# Patient Record
Sex: MALE
[De-identification: names, ages, dates, MRNs, and addresses within clinical notes are randomized per-mention and may not be internally consistent; named-entity substitution may affect disease eponyms.]

---
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
## 2024-01-18 ENCOUNTER — OUTPATIENT (OUTPATIENT)
Dept: OUTPATIENT SERVICES | Age: 19
LOS: 1 days | End: 2024-01-18

## 2024-01-18 ENCOUNTER — TRANSCRIPTION ENCOUNTER (OUTPATIENT)
Age: 19
End: 2024-01-18

## 2024-01-18 VITALS
TEMPERATURE: 98 F | RESPIRATION RATE: 12 BRPM | HEART RATE: 83 BPM | OXYGEN SATURATION: 100 % | SYSTOLIC BLOOD PRESSURE: 116 MMHG | DIASTOLIC BLOOD PRESSURE: 75 MMHG | WEIGHT: 155.65 LBS | HEIGHT: 70.28 IN

## 2024-01-18 DIAGNOSIS — Z98.890 OTHER SPECIFIED POSTPROCEDURAL STATES: Chronic | ICD-10-CM

## 2024-01-18 DIAGNOSIS — K50.90 CROHN'S DISEASE, UNSPECIFIED, WITHOUT COMPLICATIONS: ICD-10-CM

## 2024-01-18 DIAGNOSIS — R06.83 SNORING: ICD-10-CM

## 2024-01-18 LAB
ANION GAP SERPL CALC-SCNC: 11 MMOL/L — SIGNIFICANT CHANGE UP (ref 7–14)
BLD GP AB SCN SERPL QL: NEGATIVE — SIGNIFICANT CHANGE UP
BUN SERPL-MCNC: 10 MG/DL — SIGNIFICANT CHANGE UP (ref 7–23)
CALCIUM SERPL-MCNC: 9.3 MG/DL — SIGNIFICANT CHANGE UP (ref 8.4–10.5)
CHLORIDE SERPL-SCNC: 102 MMOL/L — SIGNIFICANT CHANGE UP (ref 98–107)
CO2 SERPL-SCNC: 27 MMOL/L — SIGNIFICANT CHANGE UP (ref 22–31)
CREAT SERPL-MCNC: 0.91 MG/DL — SIGNIFICANT CHANGE UP (ref 0.5–1.3)
EGFR: 125 ML/MIN/1.73M2 — SIGNIFICANT CHANGE UP
GLUCOSE SERPL-MCNC: 84 MG/DL — SIGNIFICANT CHANGE UP (ref 70–99)
HCT VFR BLD CALC: 41.3 % — SIGNIFICANT CHANGE UP (ref 39–50)
HGB BLD-MCNC: 13.8 G/DL — SIGNIFICANT CHANGE UP (ref 13–17)
MCHC RBC-ENTMCNC: 27.8 PG — SIGNIFICANT CHANGE UP (ref 27–34)
MCHC RBC-ENTMCNC: 33.4 GM/DL — SIGNIFICANT CHANGE UP (ref 32–36)
MCV RBC AUTO: 83.1 FL — SIGNIFICANT CHANGE UP (ref 80–100)
NRBC # BLD: 0 /100 WBCS — SIGNIFICANT CHANGE UP (ref 0–0)
NRBC # FLD: 0 K/UL — SIGNIFICANT CHANGE UP (ref 0–0)
PLATELET # BLD AUTO: 368 K/UL — SIGNIFICANT CHANGE UP (ref 150–400)
POTASSIUM SERPL-MCNC: 4.3 MMOL/L — SIGNIFICANT CHANGE UP (ref 3.5–5.3)
POTASSIUM SERPL-SCNC: 4.3 MMOL/L — SIGNIFICANT CHANGE UP (ref 3.5–5.3)
RBC # BLD: 4.97 M/UL — SIGNIFICANT CHANGE UP (ref 4.2–5.8)
RBC # FLD: 12.5 % — SIGNIFICANT CHANGE UP (ref 10.3–14.5)
RH IG SCN BLD-IMP: POSITIVE — SIGNIFICANT CHANGE UP
SODIUM SERPL-SCNC: 140 MMOL/L — SIGNIFICANT CHANGE UP (ref 135–145)
WBC # BLD: 8.68 K/UL — SIGNIFICANT CHANGE UP (ref 3.8–10.5)
WBC # FLD AUTO: 8.68 K/UL — SIGNIFICANT CHANGE UP (ref 3.8–10.5)

## 2024-01-18 RX ORDER — SODIUM CHLORIDE 9 MG/ML
3 INJECTION INTRAMUSCULAR; INTRAVENOUS; SUBCUTANEOUS ONCE
Refills: 0 | Status: DISCONTINUED | OUTPATIENT
Start: 2024-01-19 | End: 2024-01-23

## 2024-01-18 RX ORDER — LIDOCAINE 4 G/100G
1 CREAM TOPICAL ONCE
Refills: 0 | Status: DISCONTINUED | OUTPATIENT
Start: 2024-01-19 | End: 2024-01-23

## 2024-01-18 NOTE — H&P PST ADULT - REASON FOR ADMISSION
PST evaluation in preparation for a laparoscopic assisted ileocolic resection on 1/19/24 with Dr. Liz at [facility] [place].

## 2024-01-18 NOTE — H&P PST ADULT - ASSESSMENT
17 y/o male who presents to PST without any evidence of  acute illness or infection.  Informed parent to notify Dr. Liz if pt. develops any illness prior to dos.   CHG wipes provided at [facility].

## 2024-01-18 NOTE — H&P PST ADULT - ANESTHESIA, PREVIOUS REACTION, PROFILE
none H/o snoring with colonoscopy and tonsillar hypertrophy, last  colonoscopy in 2023  mom denies any issues.

## 2024-01-18 NOTE — H&P PST ADULT - ATTENDING COMMENTS
GIA GONZALEZ is a 18y male with ileal Crohn disease here for laparoscopic-assisted ileocectomy  I have discussed all of the risks benefits and alternatives of the procedure including but not limited to bleeding, infection, damage to nearby structures including ureters, anastomotic leak, need for stoma, anastomotic stricture, recurrent CD, small bowel obstruction, need for repeat/future procedures.  Consent is signed and on chart.

## 2024-01-18 NOTE — H&P PST ADULT - COMMENTS
FMH:  15 y/o sister: H/o seizure disorder, Autism, h/o intestinal surgery from a foreign body  Mother: H/o 2 C-sections, HTN-monitoring  Father: No PMH, No PSH  MGM: Asthma, H/o spinal fusion  MGF: Unknown   PGM: HTN, No PSH  PGF: DM, HTN, H/o cardiac bypass surgery

## 2024-01-18 NOTE — H&P PST ADULT - GASTROINTESTINAL COMMENTS
Dx with Crohn's disease in 2018 after having abdominal pain and having increased bowel movement.  Started Remicade after dx. H/o exacerbation in October 2023, worsening abdominal pain.  Started to take Rinvoke, with no improvement. Dx with Crohn's disease in 2018 after having abdominal pain and having increased bowel movement.  Started Remicade after dx. H/o exacerbation in October 2023, worsening abdominal pain.  Started to take Rinvoq, with no improvement.  Mother reports recent colonoscopy performed at [facility] on 1/10/24 showed 8-10 inches of inflammation at ileum.  Pt. was evaluated by Dr. Liz on 12/29/23 via telehealth and is now scheduled for surgical intervention.

## 2024-01-18 NOTE — H&P PST ADULT - ENMT COMMENTS
H/o loud snoring, denies any pauses H/o loud snoring, denies any pauses.  Pt. was evaluated by Dr. Guevara on 12/20/22 for evaluation of snoring and was noted to have tonsillar hypertrophy (4+).  Home sleep study was ordered and pt. reports was attempted x3, but they were unable to get any results based on the study.

## 2024-01-18 NOTE — H&P PST PEDIATRIC - COMMENTS
17 yo male with PMH significant for Crohn's disease dx in March 2018 who is currently on Remicade.  S/p CT abdomen and pelvis on 12/18/23 showed marked distal ileal circumferential wall thickening with findings c/w active Crohn's disease with no evidence of perforation or abscess formation.  Pt. is now scheduled for a laparoscopic assisted ileocolic resection on 1/19/24 with Dr. Liz at [facility] [place].    John  17 yo male with PMH significant for Crohn's disease dx in March 2018 who is currently on Remicade.  S/p CT abdomen and pelvis on 12/18/23 showed marked distal ileal circumferential wall thickening with findings c/w active Crohn's disease with no evidence of perforation or abscess formation.  Pt. was taking Remicade, but was discontinued approximately 3 months ago. Pt. is now scheduled for a laparoscopic assisted ileocolic resection on 1/19/24 with Dr. Liz at [facility] [place].    John

## 2024-01-18 NOTE — H&P PST PEDIATRIC - SYMPTOMS
Pt. dx with ileal Crohn's disease in March 2018 and has been doing well on Remicade.  S/p endoscopy and colonoscopy in June 2022 did show some active inflammation more prominent and there was visual active ileal disease with an increase in colonic inflammation though was not particular prominent.  Pt. s/p CT abdomen and pelvis on 12/18/23 which showed marked distal and terminal ileal circumferential wall thickening c/w active Crohn's disease and no evidence of perforation or abscess formation.  10 mm lower left pole indeterminate hypodensity. 1.1 cm right posterior interpolar hypodensity possible fluids collection representing a simple cyst requiring no follow-up imaging and normal appendix noted.

## 2024-01-18 NOTE — H&P PST ADULT - TYMPANIC MEMBRANE L
What Is The Reason For Today's Visit?: Full Body Skin Examination
What Is The Reason For Today's Visit? (Being Monitored For X): concerning skin lesions on an annual basis
How Severe Are Your Spot(S)?: moderate
normal light reflex

## 2024-01-18 NOTE — H&P PST ADULT - GASTROINTESTINAL
details… Pt. with mild periumbilical abdominal pain without any guarding or rebound tenderness./normal/soft/nondistended/normal active bowel sounds

## 2024-01-18 NOTE — H&P PST ADULT - HISTORY OF PRESENT ILLNESS
19 yo male with PMH significant for Crohn's disease dx in March 2018 who is currently on Remicade.  S/p CT abdomen and pelvis on 12/18/23 showed marked distal ileal circumferential wall thickening with findings c/w active Crohn's disease with no evidence of perforation or abscess formation.  Pt. was taking Remicade, but was discontinued approximately 3 months ago. Pt. is now scheduled for a laparoscopic assisted ileocolic resection on 1/19/24 with Dr. Liz at [facility] [place].    John    19 yo male with PMH significant for Crohn's disease dx in March 2018 who is currently on Remicade.  S/p CT abdomen and pelvis on 12/18/23 showed marked distal ileal circumferential wall thickening with findings c/w active Crohn's disease with no evidence of perforation or abscess formation.  Pt. was taking Remicade, but was discontinued approximately 3 months ago. Pt. is now scheduled for a laparoscopic assisted ileocolic resection on 1/19/24 with Dr. Liz at [facility] [place].    Colonoscopy performed last Wednesday.    Denies    17 yo male with PMH significant for Crohn's disease dx in March 2018 who is currently on Remicade.  S/p CT abdomen and pelvis on 12/18/23 showed marked distal ileal circumferential wall thickening with findings c/w active Crohn's disease with no evidence of perforation or abscess formation.  Pt. was taking Remicade which was discontinued approximately 3 months ago. Mother reports recent colonoscopy performed on 1/10/24 at [facility] last week showed 8-10 inches of inflammation at the ileum.  on Pt. is now scheduled for a laparoscopic assisted ileocolic resection on 1/19/24 with Dr. Liz at [facility] [place].    Mother reports pt. had a colonoscopy several years ago where snoring was noted by anesthesiologist, but mother denies any recent concerns with anesthesia last week.  Denies any bleeding complications with prior surgical challenges.

## 2024-01-18 NOTE — H&P PST PEDIATRIC - REASON FOR ADMISSION
PST evaluation in preparation for a laparoscopic assisted ileocolic resection on 1/19/24 with Dr. Liz at [facility] [place].

## 2024-01-18 NOTE — H&P PST ADULT - PROBLEM SELECTOR PLAN 1
Scheduled for a laparoscopic assisted ileocolic resection on 1/19/24 with Dr. Liz at [facility] [place].

## 2024-01-19 ENCOUNTER — INPATIENT (INPATIENT)
Age: 19
LOS: 4 days | Discharge: HOME | End: 2024-01-24
Attending: HOSPITALIST | Admitting: HOSPITALIST
Payer: COMMERCIAL

## 2024-01-19 ENCOUNTER — TRANSCRIPTION ENCOUNTER (OUTPATIENT)
Age: 19
End: 2024-01-19

## 2024-01-19 ENCOUNTER — RESULT REVIEW (OUTPATIENT)
Age: 19
End: 2024-01-19

## 2024-01-19 VITALS
DIASTOLIC BLOOD PRESSURE: 66 MMHG | OXYGEN SATURATION: 97 % | RESPIRATION RATE: 16 BRPM | SYSTOLIC BLOOD PRESSURE: 124 MMHG | TEMPERATURE: 98 F | HEART RATE: 100 BPM | HEIGHT: 70.28 IN | WEIGHT: 154.98 LBS

## 2024-01-19 DIAGNOSIS — Z98.890 OTHER SPECIFIED POSTPROCEDURAL STATES: Chronic | ICD-10-CM

## 2024-01-19 DIAGNOSIS — K50.90 CROHN'S DISEASE, UNSPECIFIED, WITHOUT COMPLICATIONS: ICD-10-CM

## 2024-01-19 PROCEDURE — 44205 LAP COLECTOMY PART W/ILEUM: CPT

## 2024-01-19 PROCEDURE — 88312 SPECIAL STAINS GROUP 1: CPT | Mod: 26

## 2024-01-19 PROCEDURE — 88307 TISSUE EXAM BY PATHOLOGIST: CPT | Mod: 26

## 2024-01-19 DEVICE — STAPLER COVIDIEN GIA 80-3.0MM PURPLE RELOAD
Type: IMPLANTABLE DEVICE | Status: NON-FUNCTIONAL
Removed: 2024-01-19

## 2024-01-19 DEVICE — STAPLER COVIDIEN GIA 80-3.0MM PURPLE
Type: IMPLANTABLE DEVICE | Status: NON-FUNCTIONAL
Removed: 2024-01-19

## 2024-01-19 DEVICE — STAPLER COVIDIEN TRI-STAPLE 60MM PURPLE RELOAD
Type: IMPLANTABLE DEVICE | Status: NON-FUNCTIONAL
Removed: 2024-01-19

## 2024-01-19 DEVICE — STAPLER COVIDIEN TRI-STAPLE 30MM PURPLE RELOAD
Type: IMPLANTABLE DEVICE | Status: NON-FUNCTIONAL
Removed: 2024-01-19

## 2024-01-19 RX ORDER — PIPERACILLIN AND TAZOBACTAM 4; .5 G/20ML; G/20ML
3375 INJECTION, POWDER, LYOPHILIZED, FOR SOLUTION INTRAVENOUS EVERY 6 HOURS
Refills: 0 | Status: COMPLETED | OUTPATIENT
Start: 2024-01-19 | End: 2024-01-20

## 2024-01-19 RX ORDER — DEXTROSE MONOHYDRATE, SODIUM CHLORIDE, AND POTASSIUM CHLORIDE 50; .745; 4.5 G/1000ML; G/1000ML; G/1000ML
1000 INJECTION, SOLUTION INTRAVENOUS
Refills: 0 | Status: DISCONTINUED | OUTPATIENT
Start: 2024-01-19 | End: 2024-01-20

## 2024-01-19 RX ORDER — FENTANYL CITRATE 50 UG/ML
35 INJECTION INTRAVENOUS
Refills: 0 | Status: DISCONTINUED | OUTPATIENT
Start: 2024-01-19 | End: 2024-01-19

## 2024-01-19 RX ORDER — OXYCODONE HYDROCHLORIDE 5 MG/1
7 TABLET ORAL ONCE
Refills: 0 | Status: DISCONTINUED | OUTPATIENT
Start: 2024-01-19 | End: 2024-01-19

## 2024-01-19 RX ORDER — ONDANSETRON 8 MG/1
4 TABLET, FILM COATED ORAL ONCE
Refills: 0 | Status: DISCONTINUED | OUTPATIENT
Start: 2024-01-19 | End: 2024-01-19

## 2024-01-19 RX ORDER — ACETAMINOPHEN 500 MG
1000 TABLET ORAL EVERY 6 HOURS
Refills: 0 | Status: COMPLETED | OUTPATIENT
Start: 2024-01-19 | End: 2024-01-20

## 2024-01-19 RX ORDER — ENOXAPARIN SODIUM 100 MG/ML
40 INJECTION SUBCUTANEOUS DAILY
Refills: 0 | Status: DISCONTINUED | OUTPATIENT
Start: 2024-01-19 | End: 2024-01-23

## 2024-01-19 RX ORDER — KETOROLAC TROMETHAMINE 30 MG/ML
15 SYRINGE (ML) INJECTION EVERY 6 HOURS
Refills: 0 | Status: DISCONTINUED | OUTPATIENT
Start: 2024-01-19 | End: 2024-01-20

## 2024-01-19 RX ORDER — HYDROMORPHONE HYDROCHLORIDE 2 MG/ML
0.5 INJECTION INTRAMUSCULAR; INTRAVENOUS; SUBCUTANEOUS
Refills: 0 | Status: DISCONTINUED | OUTPATIENT
Start: 2024-01-19 | End: 2024-01-19

## 2024-01-19 RX ADMIN — DEXTROSE MONOHYDRATE, SODIUM CHLORIDE, AND POTASSIUM CHLORIDE 110 MILLILITER(S): 50; .745; 4.5 INJECTION, SOLUTION INTRAVENOUS at 16:57

## 2024-01-19 RX ADMIN — Medication 15 MILLIGRAM(S): at 22:27

## 2024-01-19 RX ADMIN — PIPERACILLIN AND TAZOBACTAM 112.5 MILLIGRAM(S): 4; .5 INJECTION, POWDER, LYOPHILIZED, FOR SOLUTION INTRAVENOUS at 23:19

## 2024-01-19 RX ADMIN — Medication 15 MILLIGRAM(S): at 23:00

## 2024-01-19 RX ADMIN — Medication 400 MILLIGRAM(S): at 20:20

## 2024-01-19 NOTE — CHART NOTE - NSCHARTNOTEFT_GEN_A_CORE
POST-OP NOTE    GIA GONZALEZ | [id number] | [facility] [place] [facility]    Procedure: s/p     Subjective:    Vital Signs Last 24 Hrs  T(C): 36.3 (19 Jan 2024 15:50), Max: 36.6 (19 Jan 2024 12:25)  T(F): 97.3 (19 Jan 2024 15:50), Max: 97.9 (19 Jan 2024 12:25)  HR: 84 (19 Jan 2024 17:00) (62 - 100)  BP: 115/64 (19 Jan 2024 17:00) (93/50 - 124/66)  BP(mean): 79 (19 Jan 2024 17:00) (63 - 79)  RR: 13 (19 Jan 2024 17:00) (11 - 20)  SpO2: 96% (19 Jan 2024 17:00) (95% - 97%)    Parameters below as of 19 Jan 2024 17:00  Patient On (Oxygen Delivery Method): room air, oral airway removed by patient      I&O's Summary    19 Jan 2024 07:01  -  19 Jan 2024 19:43  --------------------------------------------------------  IN: 110 mL / OUT: 0 mL / NET: 110 mL                            13.8   8.68  )-----------( 368      ( 18 Jan 2024 13:55 )             41.3     01-18    140  |  102  |  10  ----------------------------<  84  4.3   |  27  |  0.91    Ca    9.3      18 Jan 2024 13:55         PHYSICAL EXAM:  Gen: NAD  Resp: breathing easily, no stridor  CV: RRR  Abdomen: soft, nontender, nondistended  Skin: Incision c/d/i. Normal color, no rashes or lesions POST-OP NOTE    GIA CARLOS | [id number] | [facility] [place] [facility] 07    Procedure: s/p Laparoscopy-assisted ileocectomy    Subjective: pt reports pain is controlled. Tolerated CLD. Voided. Denies f/c/n/v. No flatus or BM    Vital Signs Last 24 Hrs  T(C): 36.3 (19 Jan 2024 15:50), Max: 36.6 (19 Jan 2024 12:25)  T(F): 97.3 (19 Jan 2024 15:50), Max: 97.9 (19 Jan 2024 12:25)  HR: 84 (19 Jan 2024 17:00) (62 - 100)  BP: 115/64 (19 Jan 2024 17:00) (93/50 - 124/66)  BP(mean): 79 (19 Jan 2024 17:00) (63 - 79)  RR: 13 (19 Jan 2024 17:00) (11 - 20)  SpO2: 96% (19 Jan 2024 17:00) (95% - 97%)    Parameters below as of 19 Jan 2024 17:00  Patient On (Oxygen Delivery Method): room air, oral airway removed by patient      I&O's Summary    19 Jan 2024 07:01  -  19 Jan 2024 19:43  --------------------------------------------------------  IN: 110 mL / OUT: 0 mL / NET: 110 mL                            13.8   8.68  )-----------( 368      ( 18 Jan 2024 13:55 )             41.3     01-18    140  |  102  |  10  ----------------------------<  84  4.3   |  27  |  0.91    Ca    9.3      18 Jan 2024 13:55         PHYSICAL EXAM:  Gen: NAD  Resp: breathing easily, no stridor  CV: RRR  Abdomen: soft, nontender, nondistended  Skin: Incision c/d/i. Normal color, no rashes or lesions

## 2024-01-19 NOTE — BRIEF OPERATIVE NOTE - OPERATION/FINDINGS
distal 25 cm of ileum with Crohn's disease, creeping mesenteric fat, thickened bowel.  Laparoscopic assisted ileocecectomy performed via umbilical incision

## 2024-01-20 RX ORDER — ACETAMINOPHEN 500 MG
650 TABLET ORAL EVERY 6 HOURS
Refills: 0 | Status: DISCONTINUED | OUTPATIENT
Start: 2024-01-20 | End: 2024-01-20

## 2024-01-20 RX ORDER — OXYCODONE HYDROCHLORIDE 5 MG/1
5 TABLET ORAL ONCE
Refills: 0 | Status: DISCONTINUED | OUTPATIENT
Start: 2024-01-20 | End: 2024-01-20

## 2024-01-20 RX ORDER — ACETAMINOPHEN 500 MG
1000 TABLET ORAL ONCE
Refills: 0 | Status: COMPLETED | OUTPATIENT
Start: 2024-01-20 | End: 2024-01-20

## 2024-01-20 RX ORDER — OXYCODONE HYDROCHLORIDE 5 MG/1
5 TABLET ORAL EVERY 6 HOURS
Refills: 0 | Status: DISCONTINUED | OUTPATIENT
Start: 2024-01-20 | End: 2024-01-24

## 2024-01-20 RX ORDER — ONDANSETRON 8 MG/1
4 TABLET, FILM COATED ORAL ONCE
Refills: 0 | Status: COMPLETED | OUTPATIENT
Start: 2024-01-20 | End: 2024-01-20

## 2024-01-20 RX ORDER — KETOROLAC TROMETHAMINE 30 MG/ML
15 SYRINGE (ML) INJECTION EVERY 6 HOURS
Refills: 0 | Status: DISCONTINUED | OUTPATIENT
Start: 2024-01-20 | End: 2024-01-23

## 2024-01-20 RX ORDER — OXYCODONE HYDROCHLORIDE 5 MG/1
5 TABLET ORAL EVERY 6 HOURS
Refills: 0 | Status: DISCONTINUED | OUTPATIENT
Start: 2024-01-20 | End: 2024-01-20

## 2024-01-20 RX ORDER — ACETAMINOPHEN 500 MG
650 TABLET ORAL EVERY 6 HOURS
Refills: 0 | Status: DISCONTINUED | OUTPATIENT
Start: 2024-01-20 | End: 2024-01-21

## 2024-01-20 RX ORDER — IBUPROFEN 200 MG
400 TABLET ORAL EVERY 6 HOURS
Refills: 0 | Status: DISCONTINUED | OUTPATIENT
Start: 2024-01-20 | End: 2024-01-20

## 2024-01-20 RX ORDER — OXYCODONE HYDROCHLORIDE 5 MG/1
2.5 TABLET ORAL EVERY 6 HOURS
Refills: 0 | Status: DISCONTINUED | OUTPATIENT
Start: 2024-01-20 | End: 2024-01-24

## 2024-01-20 RX ADMIN — Medication 650 MILLIGRAM(S): at 21:08

## 2024-01-20 RX ADMIN — Medication 15 MILLIGRAM(S): at 09:58

## 2024-01-20 RX ADMIN — Medication 1000 MILLIGRAM(S): at 07:30

## 2024-01-20 RX ADMIN — Medication 15 MILLIGRAM(S): at 16:11

## 2024-01-20 RX ADMIN — OXYCODONE HYDROCHLORIDE 5 MILLIGRAM(S): 5 TABLET ORAL at 18:15

## 2024-01-20 RX ADMIN — OXYCODONE HYDROCHLORIDE 5 MILLIGRAM(S): 5 TABLET ORAL at 11:09

## 2024-01-20 RX ADMIN — PIPERACILLIN AND TAZOBACTAM 112.5 MILLIGRAM(S): 4; .5 INJECTION, POWDER, LYOPHILIZED, FOR SOLUTION INTRAVENOUS at 10:41

## 2024-01-20 RX ADMIN — ENOXAPARIN SODIUM 40 MILLIGRAM(S): 100 INJECTION SUBCUTANEOUS at 09:59

## 2024-01-20 RX ADMIN — Medication 1000 MILLIGRAM(S): at 13:40

## 2024-01-20 RX ADMIN — Medication 650 MILLIGRAM(S): at 20:35

## 2024-01-20 RX ADMIN — Medication 400 MILLIGRAM(S): at 13:14

## 2024-01-20 RX ADMIN — Medication 15 MILLIGRAM(S): at 04:18

## 2024-01-20 RX ADMIN — OXYCODONE HYDROCHLORIDE 5 MILLIGRAM(S): 5 TABLET ORAL at 05:45

## 2024-01-20 RX ADMIN — Medication 15 MILLIGRAM(S): at 21:42

## 2024-01-20 RX ADMIN — Medication 400 MILLIGRAM(S): at 23:21

## 2024-01-20 RX ADMIN — ONDANSETRON 8 MILLIGRAM(S): 8 TABLET, FILM COATED ORAL at 23:49

## 2024-01-20 RX ADMIN — OXYCODONE HYDROCHLORIDE 5 MILLIGRAM(S): 5 TABLET ORAL at 17:10

## 2024-01-20 RX ADMIN — Medication 400 MILLIGRAM(S): at 07:05

## 2024-01-20 RX ADMIN — Medication 1000 MILLIGRAM(S): at 01:45

## 2024-01-20 RX ADMIN — Medication 1000 MILLIGRAM(S): at 23:44

## 2024-01-20 RX ADMIN — OXYCODONE HYDROCHLORIDE 5 MILLIGRAM(S): 5 TABLET ORAL at 04:57

## 2024-01-20 RX ADMIN — Medication 15 MILLIGRAM(S): at 23:14

## 2024-01-20 RX ADMIN — Medication 400 MILLIGRAM(S): at 01:09

## 2024-01-20 RX ADMIN — Medication 15 MILLIGRAM(S): at 10:15

## 2024-01-20 RX ADMIN — Medication 15 MILLIGRAM(S): at 16:30

## 2024-01-20 RX ADMIN — OXYCODONE HYDROCHLORIDE 5 MILLIGRAM(S): 5 TABLET ORAL at 11:56

## 2024-01-20 RX ADMIN — PIPERACILLIN AND TAZOBACTAM 112.5 MILLIGRAM(S): 4; .5 INJECTION, POWDER, LYOPHILIZED, FOR SOLUTION INTRAVENOUS at 04:42

## 2024-01-20 RX ADMIN — Medication 15 MILLIGRAM(S): at 05:00

## 2024-01-20 NOTE — PROGRESS NOTE PEDS - SUBJECTIVE AND OBJECTIVE BOX
PEDIATRIC GENERAL SURGERY PROGRESS NOTE    GIA GONZALEZ  |  [id number]      S:    O:   Vital Signs Last 24 Hrs  T(C): 36.5 (19 Jan 2024 21:32), Max: 36.6 (19 Jan 2024 12:25)  T(F): 97.7 (19 Jan 2024 21:32), Max: 97.9 (19 Jan 2024 12:25)  HR: 70 (19 Jan 2024 21:32) (62 - 100)  BP: 111/67 (19 Jan 2024 21:32) (93/50 - 124/66)  BP(mean): 82 (19 Jan 2024 21:32) (63 - 83)  RR: 18 (19 Jan 2024 21:32) (11 - 25)  SpO2: 95% (19 Jan 2024 21:32) (95% - 99%)    Parameters below as of 19 Jan 2024 21:32  Patient On (Oxygen Delivery Method): room air        PHYSICAL EXAM:  GENERAL: NAD, well-groomed, well-developed  HEENT: NC/AT  CHEST/LUNG: Breathing even, unlabored  HEART: Regular rate and rhythm  ABDOMEN: Soft, nondistended.  EXTREMITIES: good distal pulses b/l   NEURO:  No focal deficits                          13.8   8.68  )-----------( 368      ( 18 Jan 2024 13:55 )             41.3     01-18    140  |  102  |  10  ----------------------------<  84  4.3   |  27  |  0.91    Ca    9.3      18 Jan 2024 13:55          01-19-24 @ 07:01  -  01-20-24 @ 00:55  --------------------------------------------------------  IN: 2383 mL / OUT: 1250 mL / NET: 1133 mL        IMAGING STUDIES:      - Antibiotics:    PEDIATRIC GENERAL SURGERY PROGRESS NOTE    GIA GONZALEZ  |  [id number]      S: NAEON. Pt tolerated CLD and voided. Denies f/c/n/v.     O:   Vital Signs Last 24 Hrs  T(C): 36.5 (19 Jan 2024 21:32), Max: 36.6 (19 Jan 2024 12:25)  T(F): 97.7 (19 Jan 2024 21:32), Max: 97.9 (19 Jan 2024 12:25)  HR: 70 (19 Jan 2024 21:32) (62 - 100)  BP: 111/67 (19 Jan 2024 21:32) (93/50 - 124/66)  BP(mean): 82 (19 Jan 2024 21:32) (63 - 83)  RR: 18 (19 Jan 2024 21:32) (11 - 25)  SpO2: 95% (19 Jan 2024 21:32) (95% - 99%)    Parameters below as of 19 Jan 2024 21:32  Patient On (Oxygen Delivery Method): room air        PHYSICAL EXAM:  GENERAL: NAD, well-groomed, well-developed  HEENT: NC/AT  CHEST/LUNG: Breathing even, unlabored  HEART: Regular rate and rhythm  ABDOMEN: Soft, nondistended.  EXTREMITIES: good distal pulses b/l   NEURO:  No focal deficits                          13.8   8.68  )-----------( 368      ( 18 Jan 2024 13:55 )             41.3     01-18    140  |  102  |  10  ----------------------------<  84  4.3   |  27  |  0.91    Ca    9.3      18 Jan 2024 13:55          01-19-24 @ 07:01  -  01-20-24 @ 00:55  --------------------------------------------------------  IN: 2383 mL / OUT: 1250 mL / NET: 1133 mL        IMAGING STUDIES:      - Antibiotics:    PEDIATRIC GENERAL SURGERY PROGRESS NOTE    GIA GONZALEZ  |  [id number]      S: NAEON. Pt tolerated CLD and voided. Denies f/c/n/v.     O:   Vital Signs Last 24 Hrs  T(C): 36.5 (19 Jan 2024 21:32), Max: 36.6 (19 Jan 2024 12:25)  T(F): 97.7 (19 Jan 2024 21:32), Max: 97.9 (19 Jan 2024 12:25)  HR: 70 (19 Jan 2024 21:32) (62 - 100)  BP: 111/67 (19 Jan 2024 21:32) (93/50 - 124/66)  BP(mean): 82 (19 Jan 2024 21:32) (63 - 83)  RR: 18 (19 Jan 2024 21:32) (11 - 25)  SpO2: 95% (19 Jan 2024 21:32) (95% - 99%)    Parameters below as of 19 Jan 2024 21:32  Patient On (Oxygen Delivery Method): room air        PHYSICAL EXAM:  GENERAL: NAD, well-groomed, well-developed  HEENT: NC/AT  CHEST/LUNG: Breathing even, unlabored  HEART: Regular rate and rhythm  ABDOMEN: Soft, nondistended, incisions c/d/i  EXTREMITIES: Good distal pulses b/l   NEURO:  No focal deficits

## 2024-01-20 NOTE — PROGRESS NOTE PEDS - ASSESSMENT
A:  GIA GONZALEZ is a 18y Male s/p laparoscopic ileocectomy on 1/19.     P:  - Pain control   - zosyn for 24 hours  - OOBA  - Incentive spirometry   - Diet: CLD  - mIVF. will d/c mIVF when good PO.   - lovenox till ambulate A:  GIA GONZALEZ is a 18y Male with h/o Crohn's disease s/p laparoscopic ileocectomy on 1/19.     P:  - Pain control   - zosyn for 24 hours  - OOBA  - Incentive spirometry   - Diet: CLD  - mIVF. will d/c mIVF when good PO.   - lovenox till ambulate A:  GIA GONZALEZ is a 18y Male with h/o Crohn's disease s/p laparoscopic ileocectomy on 1/19. No acute events overnight.     P:  - Pain control   - Zosyn for 24 hours  - OOBAT  - Incentive spirometry   - Diet: CLD  - mIVF. will d/c mIVF when good PO.   - Lovenox till ambulate    Pediatric Surgery  [id number]

## 2024-01-21 LAB
ANION GAP SERPL CALC-SCNC: 10 MMOL/L — SIGNIFICANT CHANGE UP (ref 7–14)
APPEARANCE UR: CLEAR — SIGNIFICANT CHANGE UP
B PERT DNA SPEC QL NAA+PROBE: SIGNIFICANT CHANGE UP
B PERT+PARAPERT DNA PNL SPEC NAA+PROBE: SIGNIFICANT CHANGE UP
BACTERIA # UR AUTO: NEGATIVE /HPF — SIGNIFICANT CHANGE UP
BASOPHILS # BLD AUTO: 0 K/UL — SIGNIFICANT CHANGE UP (ref 0–0.2)
BASOPHILS NFR BLD AUTO: 0 % — SIGNIFICANT CHANGE UP (ref 0–2)
BILIRUB UR-MCNC: NEGATIVE — SIGNIFICANT CHANGE UP
BORDETELLA PARAPERTUSSIS (RAPRVP): SIGNIFICANT CHANGE UP
BUN SERPL-MCNC: 10 MG/DL — SIGNIFICANT CHANGE UP (ref 7–23)
C PNEUM DNA SPEC QL NAA+PROBE: SIGNIFICANT CHANGE UP
CALCIUM SERPL-MCNC: 8.2 MG/DL — LOW (ref 8.4–10.5)
CHLORIDE SERPL-SCNC: 102 MMOL/L — SIGNIFICANT CHANGE UP (ref 98–107)
CO2 SERPL-SCNC: 23 MMOL/L — SIGNIFICANT CHANGE UP (ref 22–31)
COLOR SPEC: SIGNIFICANT CHANGE UP
CREAT SERPL-MCNC: 0.97 MG/DL — SIGNIFICANT CHANGE UP (ref 0.5–1.3)
DIFF PNL FLD: NEGATIVE — SIGNIFICANT CHANGE UP
EGFR: 115 ML/MIN/1.73M2 — SIGNIFICANT CHANGE UP
EOSINOPHIL # BLD AUTO: 0 K/UL — SIGNIFICANT CHANGE UP (ref 0–0.5)
EOSINOPHIL NFR BLD AUTO: 0 % — SIGNIFICANT CHANGE UP (ref 0–6)
EPI CELLS # UR: SIGNIFICANT CHANGE UP
FLUAV SUBTYP SPEC NAA+PROBE: SIGNIFICANT CHANGE UP
FLUBV RNA SPEC QL NAA+PROBE: SIGNIFICANT CHANGE UP
GLUCOSE SERPL-MCNC: 157 MG/DL — HIGH (ref 70–99)
GLUCOSE UR QL: NEGATIVE MG/DL — SIGNIFICANT CHANGE UP
HADV DNA SPEC QL NAA+PROBE: SIGNIFICANT CHANGE UP
HCOV 229E RNA SPEC QL NAA+PROBE: SIGNIFICANT CHANGE UP
HCOV HKU1 RNA SPEC QL NAA+PROBE: SIGNIFICANT CHANGE UP
HCOV NL63 RNA SPEC QL NAA+PROBE: SIGNIFICANT CHANGE UP
HCOV OC43 RNA SPEC QL NAA+PROBE: SIGNIFICANT CHANGE UP
HCT VFR BLD CALC: 32.5 % — LOW (ref 39–50)
HGB BLD-MCNC: 11 G/DL — LOW (ref 13–17)
HMPV RNA SPEC QL NAA+PROBE: SIGNIFICANT CHANGE UP
HPIV1 RNA SPEC QL NAA+PROBE: SIGNIFICANT CHANGE UP
HPIV2 RNA SPEC QL NAA+PROBE: SIGNIFICANT CHANGE UP
HPIV3 RNA SPEC QL NAA+PROBE: SIGNIFICANT CHANGE UP
HPIV4 RNA SPEC QL NAA+PROBE: SIGNIFICANT CHANGE UP
HYALINE CASTS # UR AUTO: SIGNIFICANT CHANGE UP
IANC: 14.98 K/UL — HIGH (ref 1.8–7.4)
KETONES UR-MCNC: 15 MG/DL
LEUKOCYTE ESTERASE UR-ACNC: NEGATIVE — SIGNIFICANT CHANGE UP
LYMPHOCYTES # BLD AUTO: 0.31 K/UL — LOW (ref 1–3.3)
LYMPHOCYTES # BLD AUTO: 1.8 % — LOW (ref 13–44)
M PNEUMO DNA SPEC QL NAA+PROBE: SIGNIFICANT CHANGE UP
MAGNESIUM SERPL-MCNC: 1.6 MG/DL — SIGNIFICANT CHANGE UP (ref 1.6–2.6)
MANUAL SMEAR VERIFICATION: SIGNIFICANT CHANGE UP
MCHC RBC-ENTMCNC: 27.6 PG — SIGNIFICANT CHANGE UP (ref 27–34)
MCHC RBC-ENTMCNC: 33.8 GM/DL — SIGNIFICANT CHANGE UP (ref 32–36)
MCV RBC AUTO: 81.5 FL — SIGNIFICANT CHANGE UP (ref 80–100)
MONOCYTES # BLD AUTO: 0.29 K/UL — SIGNIFICANT CHANGE UP (ref 0–0.9)
MONOCYTES NFR BLD AUTO: 1.7 % — LOW (ref 2–14)
NEUTROPHILS # BLD AUTO: 16.67 K/UL — HIGH (ref 1.8–7.4)
NEUTROPHILS NFR BLD AUTO: 96.5 % — HIGH (ref 43–77)
NITRITE UR-MCNC: NEGATIVE — SIGNIFICANT CHANGE UP
NRBC # BLD: 0 /100 WBCS — SIGNIFICANT CHANGE UP (ref 0–0)
NRBC # FLD: 0 K/UL — SIGNIFICANT CHANGE UP (ref 0–0)
PH UR: 5.5 — SIGNIFICANT CHANGE UP (ref 5–8)
PHOSPHATE SERPL-MCNC: 1.8 MG/DL — LOW (ref 2.5–4.5)
PLAT MORPH BLD: NORMAL — SIGNIFICANT CHANGE UP
PLATELET # BLD AUTO: 264 K/UL — SIGNIFICANT CHANGE UP (ref 150–400)
PLATELET COUNT - ESTIMATE: NORMAL — SIGNIFICANT CHANGE UP
POLYCHROMASIA BLD QL SMEAR: SLIGHT — SIGNIFICANT CHANGE UP
POTASSIUM SERPL-MCNC: 3.8 MMOL/L — SIGNIFICANT CHANGE UP (ref 3.5–5.3)
POTASSIUM SERPL-SCNC: 3.8 MMOL/L — SIGNIFICANT CHANGE UP (ref 3.5–5.3)
PROT UR-MCNC: 30 MG/DL
RAPID RVP RESULT: SIGNIFICANT CHANGE UP
RBC # BLD: 3.99 M/UL — LOW (ref 4.2–5.8)
RBC # FLD: 13 % — SIGNIFICANT CHANGE UP (ref 10.3–14.5)
RBC BLD AUTO: NORMAL — SIGNIFICANT CHANGE UP
RBC CASTS # UR COMP ASSIST: 0 /HPF — SIGNIFICANT CHANGE UP (ref 0–4)
RSV RNA SPEC QL NAA+PROBE: SIGNIFICANT CHANGE UP
RV+EV RNA SPEC QL NAA+PROBE: SIGNIFICANT CHANGE UP
SARS-COV-2 RNA SPEC QL NAA+PROBE: SIGNIFICANT CHANGE UP
SODIUM SERPL-SCNC: 135 MMOL/L — SIGNIFICANT CHANGE UP (ref 135–145)
SP GR SPEC: 1.04 — HIGH (ref 1–1.03)
UROBILINOGEN FLD QL: 1 MG/DL — SIGNIFICANT CHANGE UP (ref 0.2–1)
WBC # BLD: 17.27 K/UL — HIGH (ref 3.8–10.5)
WBC # FLD AUTO: 17.27 K/UL — HIGH (ref 3.8–10.5)
WBC UR QL: 1 /HPF — SIGNIFICANT CHANGE UP (ref 0–5)

## 2024-01-21 PROCEDURE — 71045 X-RAY EXAM CHEST 1 VIEW: CPT | Mod: 26

## 2024-01-21 PROCEDURE — 74177 CT ABD & PELVIS W/CONTRAST: CPT | Mod: 26

## 2024-01-21 RX ORDER — ONDANSETRON 8 MG/1
4 TABLET, FILM COATED ORAL EVERY 6 HOURS
Refills: 0 | Status: DISCONTINUED | OUTPATIENT
Start: 2024-01-21 | End: 2024-01-21

## 2024-01-21 RX ORDER — SODIUM CHLORIDE 9 MG/ML
700 INJECTION INTRAMUSCULAR; INTRAVENOUS; SUBCUTANEOUS ONCE
Refills: 0 | Status: COMPLETED | OUTPATIENT
Start: 2024-01-21 | End: 2024-01-21

## 2024-01-21 RX ORDER — PIPERACILLIN AND TAZOBACTAM 4; .5 G/20ML; G/20ML
INJECTION, POWDER, LYOPHILIZED, FOR SOLUTION INTRAVENOUS
Refills: 0 | Status: DISCONTINUED | OUTPATIENT
Start: 2024-01-21 | End: 2024-01-21

## 2024-01-21 RX ORDER — PIPERACILLIN AND TAZOBACTAM 4; .5 G/20ML; G/20ML
3000 INJECTION, POWDER, LYOPHILIZED, FOR SOLUTION INTRAVENOUS ONCE
Refills: 0 | Status: COMPLETED | OUTPATIENT
Start: 2024-01-21 | End: 2024-01-21

## 2024-01-21 RX ORDER — DEXTROSE MONOHYDRATE, SODIUM CHLORIDE, AND POTASSIUM CHLORIDE 50; .745; 4.5 G/1000ML; G/1000ML; G/1000ML
1000 INJECTION, SOLUTION INTRAVENOUS
Refills: 0 | Status: DISCONTINUED | OUTPATIENT
Start: 2024-01-21 | End: 2024-01-22

## 2024-01-21 RX ORDER — MAGNESIUM SULFATE 500 MG/ML
12.5 VIAL (ML) INJECTION
Qty: 40 | Refills: 0 | Status: DISCONTINUED | OUTPATIENT
Start: 2024-01-21 | End: 2024-01-21

## 2024-01-21 RX ORDER — PIPERACILLIN AND TAZOBACTAM 4; .5 G/20ML; G/20ML
3000 INJECTION, POWDER, LYOPHILIZED, FOR SOLUTION INTRAVENOUS EVERY 6 HOURS
Refills: 0 | Status: DISCONTINUED | OUTPATIENT
Start: 2024-01-21 | End: 2024-01-21

## 2024-01-21 RX ORDER — PIPERACILLIN AND TAZOBACTAM 4; .5 G/20ML; G/20ML
3000 INJECTION, POWDER, LYOPHILIZED, FOR SOLUTION INTRAVENOUS ONCE
Refills: 0 | Status: DISCONTINUED | OUTPATIENT
Start: 2024-01-21 | End: 2024-01-21

## 2024-01-21 RX ORDER — ONDANSETRON 8 MG/1
4 TABLET, FILM COATED ORAL EVERY 8 HOURS
Refills: 0 | Status: DISCONTINUED | OUTPATIENT
Start: 2024-01-21 | End: 2024-01-24

## 2024-01-21 RX ORDER — PIPERACILLIN AND TAZOBACTAM 4; .5 G/20ML; G/20ML
3000 INJECTION, POWDER, LYOPHILIZED, FOR SOLUTION INTRAVENOUS EVERY 6 HOURS
Refills: 0 | Status: DISCONTINUED | OUTPATIENT
Start: 2024-01-21 | End: 2024-01-23

## 2024-01-21 RX ORDER — ACETAMINOPHEN 500 MG
1000 TABLET ORAL EVERY 6 HOURS
Refills: 0 | Status: DISCONTINUED | OUTPATIENT
Start: 2024-01-21 | End: 2024-01-23

## 2024-01-21 RX ORDER — ACETAMINOPHEN 500 MG
1000 TABLET ORAL ONCE
Refills: 0 | Status: COMPLETED | OUTPATIENT
Start: 2024-01-21 | End: 2024-01-21

## 2024-01-21 RX ADMIN — Medication 1000 MILLIGRAM(S): at 15:03

## 2024-01-21 RX ADMIN — Medication 15 MILLIGRAM(S): at 15:57

## 2024-01-21 RX ADMIN — Medication 15 MILLIGRAM(S): at 03:51

## 2024-01-21 RX ADMIN — Medication 400 MILLIGRAM(S): at 22:29

## 2024-01-21 RX ADMIN — SODIUM CHLORIDE 700 MILLILITER(S): 9 INJECTION INTRAMUSCULAR; INTRAVENOUS; SUBCUTANEOUS at 09:42

## 2024-01-21 RX ADMIN — Medication 15 MILLIGRAM(S): at 10:05

## 2024-01-21 RX ADMIN — ONDANSETRON 8 MILLIGRAM(S): 8 TABLET, FILM COATED ORAL at 13:46

## 2024-01-21 RX ADMIN — OXYCODONE HYDROCHLORIDE 5 MILLIGRAM(S): 5 TABLET ORAL at 11:10

## 2024-01-21 RX ADMIN — OXYCODONE HYDROCHLORIDE 5 MILLIGRAM(S): 5 TABLET ORAL at 21:04

## 2024-01-21 RX ADMIN — Medication 15 MILLIGRAM(S): at 04:40

## 2024-01-21 RX ADMIN — OXYCODONE HYDROCHLORIDE 5 MILLIGRAM(S): 5 TABLET ORAL at 10:07

## 2024-01-21 RX ADMIN — Medication 15 MILLIGRAM(S): at 16:30

## 2024-01-21 RX ADMIN — ONDANSETRON 8 MILLIGRAM(S): 8 TABLET, FILM COATED ORAL at 06:31

## 2024-01-21 RX ADMIN — DEXTROSE MONOHYDRATE, SODIUM CHLORIDE, AND POTASSIUM CHLORIDE 110 MILLILITER(S): 50; .745; 4.5 INJECTION, SOLUTION INTRAVENOUS at 02:46

## 2024-01-21 RX ADMIN — OXYCODONE HYDROCHLORIDE 5 MILLIGRAM(S): 5 TABLET ORAL at 00:47

## 2024-01-21 RX ADMIN — Medication 650 MILLIGRAM(S): at 08:30

## 2024-01-21 RX ADMIN — PIPERACILLIN AND TAZOBACTAM 100 MILLIGRAM(S): 4; .5 INJECTION, POWDER, LYOPHILIZED, FOR SOLUTION INTRAVENOUS at 12:13

## 2024-01-21 RX ADMIN — ONDANSETRON 8 MILLIGRAM(S): 8 TABLET, FILM COATED ORAL at 22:31

## 2024-01-21 RX ADMIN — Medication 400 MILLIGRAM(S): at 14:03

## 2024-01-21 RX ADMIN — PIPERACILLIN AND TAZOBACTAM 100 MILLIGRAM(S): 4; .5 INJECTION, POWDER, LYOPHILIZED, FOR SOLUTION INTRAVENOUS at 23:54

## 2024-01-21 RX ADMIN — PIPERACILLIN AND TAZOBACTAM 100 MILLIGRAM(S): 4; .5 INJECTION, POWDER, LYOPHILIZED, FOR SOLUTION INTRAVENOUS at 18:02

## 2024-01-21 RX ADMIN — Medication 1000 MILLIGRAM(S): at 23:01

## 2024-01-21 RX ADMIN — ENOXAPARIN SODIUM 40 MILLIGRAM(S): 100 INJECTION SUBCUTANEOUS at 10:02

## 2024-01-21 RX ADMIN — Medication 650 MILLIGRAM(S): at 07:52

## 2024-01-21 RX ADMIN — Medication 15 MILLIGRAM(S): at 23:28

## 2024-01-21 RX ADMIN — Medication 15 MILLIGRAM(S): at 09:42

## 2024-01-21 RX ADMIN — DEXTROSE MONOHYDRATE, SODIUM CHLORIDE, AND POTASSIUM CHLORIDE 110 MILLILITER(S): 50; .745; 4.5 INJECTION, SOLUTION INTRAVENOUS at 19:16

## 2024-01-21 RX ADMIN — SODIUM CHLORIDE 700 MILLILITER(S): 9 INJECTION INTRAMUSCULAR; INTRAVENOUS; SUBCUTANEOUS at 14:55

## 2024-01-21 RX ADMIN — DEXTROSE MONOHYDRATE, SODIUM CHLORIDE, AND POTASSIUM CHLORIDE 110 MILLILITER(S): 50; .745; 4.5 INJECTION, SOLUTION INTRAVENOUS at 07:13

## 2024-01-21 RX ADMIN — Medication 36.67 MILLIMOLE(S): at 12:52

## 2024-01-21 NOTE — PROGRESS NOTE PEDS - ASSESSMENT
A:  GIA GONZALEZ is a 18y Male with h/o Crohn's disease s/p laparoscopic ileocectomy on 1/19. No acute events overnight.     P:  - Pain control   - OOBAT  - Incentive spirometry   - Regular diet  - Lovenox till ambulate    Pediatric Surgery  [id number] A:  GIA GONZALEZ is a 18y Male with h/o Crohn's disease s/p laparoscopic ileocectomy on 1/19.     Plan:  - Pain control   - OOBAT  - Incentive spirometry   - Regular diet  - Lovenox till ambulate    Pediatric Surgery  [id number] A:  GIA GONZALEZ is a 18y Male with h/o Crohn's disease s/p laparoscopic ileocectomy on 1/19.     Plan:  - Pain control   - OOBAT  - Incentive spirometry   - NPO with sips and chips and meds i/s/o fevers, and tachycardia 01/21.  - IV tylenol PRN  - Zosyn given fevers, tachy and leukocytosis  - Lovenox till ambulate    Pediatric Surgery  [id number]

## 2024-01-21 NOTE — CHART NOTE - NSCHARTNOTEFT_GEN_A_CORE
Skip is a 19yo POD2 s/p laparoscopic assisted ileocecectomy for Crohn's Disease. He was doing well and eating. Last night, had a low grade fever at 9pm with low grade tachycardia. This morning had a fever above 102 and HR again above 120. On exam, he has been mildly but appropriately tender without significant distension and without signs of peritonitis. Labs including CBC, BMP, Blood Cx were sent. A CXR was obtained. He had been on a regular diet. He was backed down to sips for comfort this morning. Labs notable for a leukocytosis and hypophos. Phos replaced. NS bolus given. Fluid at 1x. He urinated overnight, but has not urinated this AM or early afternoon despite the bolus. Zosyn was started out of an abundance of caution.    Now at 1430, his exam is unchanged. He appears uncomfortable but not toxic. His abdomen is soft and mildly tender without peritonitis. At 1440 was able to pee 425mL - appears concentrated.     Given the clinical picture, will obtain CT scan abdomen and pelvis with IV and PO contrast. Contrast has been brought to the bedside by the surgery team. Will plan for 2 hours post contrast ingestion for CT scan. CT techs/radiology aware.     The patient did not and does not meet criteria for stress dose steroids, but he is not entirely steroid naive. Will not start steroids at this time, but may consider a stress dose after the CT scan is resulted.    I discussed this with the patient and his mother at length. I answered all questions. Both the attending surgeon of record, Agusto, and on call, Farzana, are aware and agree with the plan as stated above.     NPO  Continue Zosyn  CT scan with PO/IV contrast    ---  Alfonzo Velasco Surgery Fellow

## 2024-01-21 NOTE — PROGRESS NOTE PEDS - SUBJECTIVE AND OBJECTIVE BOX
PEDIATRIC GENERAL SURGERY PROGRESS NOTE    GIA GONZALEZ  |  [id number]      S: NAEON. Pain is controlled. Pt reports nausea and poor PO. 1 episode of emesis. No flatus or BM     O:   Vital Signs Last 24 Hrs  T(C): 38.2 (20 Jan 2024 21:44), Max: 38.2 (20 Jan 2024 21:44)  T(F): 100.8 (20 Jan 2024 21:44), Max: 100.8 (20 Jan 2024 21:44)  HR: 120 (20 Jan 2024 21:58) (72 - 126)  BP: 128/68 (20 Jan 2024 21:58) (101/52 - 128/68)  BP(mean): 89 (20 Jan 2024 17:29) (89 - 89)  RR: 20 (20 Jan 2024 21:44) (18 - 20)  SpO2: 96% (20 Jan 2024 21:44) (96% - 99%)    Parameters below as of 20 Jan 2024 21:44  Patient On (Oxygen Delivery Method): room air        PHYSICAL EXAM:  GENERAL: NAD, well-groomed, well-developed  HEENT: NC/AT  CHEST/LUNG: Breathing even, unlabored  HEART: Regular rate and rhythm  ABDOMEN: Soft, nondistended. surgical site c/d/i  EXTREMITIES: good distal pulses b/l   NEURO:  No focal deficits                01-19-24 @ 07:01 - 01-20-24 @ 07:00  --------------------------------------------------------  IN: 2493 mL / OUT: 1250 mL / NET: 1243 mL    01-20-24 @ 07:01 - 01-21-24 @ 00:07  --------------------------------------------------------  IN: 960 mL / OUT: 751 mL / NET: 209 mL        IMAGING STUDIES:     PEDIATRIC GENERAL SURGERY PROGRESS NOTE    GIA GONZALEZ  |  [id number]      S: NAEON. Pain is controlled. Pt reports nausea and poor PO. Pain is controlled with pian meds. 1 episode of emesis after taking PO tylenol. No flatus or BM     O:   Vital Signs Last 24 Hrs  T(C): 38.2 (20 Jan 2024 21:44), Max: 38.2 (20 Jan 2024 21:44)  T(F): 100.8 (20 Jan 2024 21:44), Max: 100.8 (20 Jan 2024 21:44)  HR: 120 (20 Jan 2024 21:58) (72 - 126)  BP: 128/68 (20 Jan 2024 21:58) (101/52 - 128/68)  BP(mean): 89 (20 Jan 2024 17:29) (89 - 89)  RR: 20 (20 Jan 2024 21:44) (18 - 20)  SpO2: 96% (20 Jan 2024 21:44) (96% - 99%)    Parameters below as of 20 Jan 2024 21:44  Patient On (Oxygen Delivery Method): room air        PHYSICAL EXAM:  GENERAL: NAD, well-groomed, well-developed  HEENT: NC/AT  CHEST/LUNG: Breathing even, unlabored  HEART: Regular rate and rhythm  ABDOMEN: Soft, nondistended. surgical site c/d/i  EXTREMITIES: good distal pulses b/l   NEURO:  No focal deficits                01-19-24 @ 07:01  -  01-20-24 @ 07:00  --------------------------------------------------------  IN: 2493 mL / OUT: 1250 mL / NET: 1243 mL    01-20-24 @ 07:01 - 01-21-24 @ 00:07  --------------------------------------------------------  IN: 960 mL / OUT: 751 mL / NET: 209 mL        IMAGING STUDIES:

## 2024-01-21 NOTE — PROGRESS NOTE PEDS - ATTENDING COMMENTS
had low grade Temp last night with some vomiting  better overnight  abd soft and nondist; mild tenderness mainly at incisions; no peritoneal signs  wounds okay  Fever w/u initiated last night  clears for now  abx were 24 hours only; will cnosder abx further idf contrinued fever  discuuse aiht patient and mom had low grade Temp last night with some vomiting  better overnight  abd soft and nondist; mild tenderness mainly at incisions; no peritoneal signs  wounds okay  Fever w/u initiated last night  clears for now  abx were 24 hours only; will consider abx further if continued fever  AXR last night looks okay  discussed with patient and mom  will monitor very closely and f/u fever w/u

## 2024-01-21 NOTE — PROVIDER CONTACT NOTE (SEPSIS SCREENING) - RECOMMENDATIONS:
Stat NS bolus, Stat labs CBC, CMP. No further imaging at this time. PO tylenol switched to IV for next dose. NPO w/ ice chips.

## 2024-01-22 LAB
ANION GAP SERPL CALC-SCNC: 11 MMOL/L — SIGNIFICANT CHANGE UP (ref 7–14)
BUN SERPL-MCNC: 8 MG/DL — SIGNIFICANT CHANGE UP (ref 7–23)
CALCIUM SERPL-MCNC: 8.2 MG/DL — LOW (ref 8.4–10.5)
CHLORIDE SERPL-SCNC: 103 MMOL/L — SIGNIFICANT CHANGE UP (ref 98–107)
CO2 SERPL-SCNC: 20 MMOL/L — LOW (ref 22–31)
CREAT SERPL-MCNC: 0.95 MG/DL — SIGNIFICANT CHANGE UP (ref 0.5–1.3)
EGFR: 118 ML/MIN/1.73M2 — SIGNIFICANT CHANGE UP
GLUCOSE SERPL-MCNC: 103 MG/DL — HIGH (ref 70–99)
HCT VFR BLD CALC: 32.2 % — LOW (ref 39–50)
HGB BLD-MCNC: 10.8 G/DL — LOW (ref 13–17)
MAGNESIUM SERPL-MCNC: 1.6 MG/DL — SIGNIFICANT CHANGE UP (ref 1.6–2.6)
MCHC RBC-ENTMCNC: 27.6 PG — SIGNIFICANT CHANGE UP (ref 27–34)
MCHC RBC-ENTMCNC: 33.5 GM/DL — SIGNIFICANT CHANGE UP (ref 32–36)
MCV RBC AUTO: 82.4 FL — SIGNIFICANT CHANGE UP (ref 80–100)
NRBC # BLD: 0 /100 WBCS — SIGNIFICANT CHANGE UP (ref 0–0)
NRBC # FLD: 0 K/UL — SIGNIFICANT CHANGE UP (ref 0–0)
PHOSPHATE SERPL-MCNC: 3.1 MG/DL — SIGNIFICANT CHANGE UP (ref 2.5–4.5)
PLATELET # BLD AUTO: 194 K/UL — SIGNIFICANT CHANGE UP (ref 150–400)
POTASSIUM SERPL-MCNC: 3.9 MMOL/L — SIGNIFICANT CHANGE UP (ref 3.5–5.3)
POTASSIUM SERPL-SCNC: 3.9 MMOL/L — SIGNIFICANT CHANGE UP (ref 3.5–5.3)
RBC # BLD: 3.91 M/UL — LOW (ref 4.2–5.8)
RBC # FLD: 13.3 % — SIGNIFICANT CHANGE UP (ref 10.3–14.5)
SODIUM SERPL-SCNC: 134 MMOL/L — LOW (ref 135–145)
WBC # BLD: 13.34 K/UL — HIGH (ref 3.8–10.5)
WBC # FLD AUTO: 13.34 K/UL — HIGH (ref 3.8–10.5)

## 2024-01-22 PROCEDURE — 99231 SBSQ HOSP IP/OBS SF/LOW 25: CPT | Mod: 24,GC

## 2024-01-22 RX ORDER — HYDROCORTISONE 20 MG
50 TABLET ORAL EVERY 8 HOURS
Refills: 0 | Status: DISCONTINUED | OUTPATIENT
Start: 2024-01-24 | End: 2024-01-24

## 2024-01-22 RX ORDER — HYDROCORTISONE 20 MG
75 TABLET ORAL EVERY 8 HOURS
Refills: 0 | Status: COMPLETED | OUTPATIENT
Start: 2024-01-23 | End: 2024-01-23

## 2024-01-22 RX ORDER — HYDROCORTISONE 20 MG
100 TABLET ORAL EVERY 8 HOURS
Refills: 0 | Status: COMPLETED | OUTPATIENT
Start: 2024-01-22 | End: 2024-01-23

## 2024-01-22 RX ADMIN — OXYCODONE HYDROCHLORIDE 2.5 MILLIGRAM(S): 5 TABLET ORAL at 01:17

## 2024-01-22 RX ADMIN — ONDANSETRON 8 MILLIGRAM(S): 8 TABLET, FILM COATED ORAL at 13:29

## 2024-01-22 RX ADMIN — OXYCODONE HYDROCHLORIDE 2.5 MILLIGRAM(S): 5 TABLET ORAL at 02:41

## 2024-01-22 RX ADMIN — Medication 15 MILLIGRAM(S): at 23:36

## 2024-01-22 RX ADMIN — PIPERACILLIN AND TAZOBACTAM 100 MILLIGRAM(S): 4; .5 INJECTION, POWDER, LYOPHILIZED, FOR SOLUTION INTRAVENOUS at 18:15

## 2024-01-22 RX ADMIN — Medication 15 MILLIGRAM(S): at 00:08

## 2024-01-22 RX ADMIN — DEXTROSE MONOHYDRATE, SODIUM CHLORIDE, AND POTASSIUM CHLORIDE 110 MILLILITER(S): 50; .745; 4.5 INJECTION, SOLUTION INTRAVENOUS at 01:49

## 2024-01-22 RX ADMIN — Medication 200 MILLIGRAM(S): at 14:22

## 2024-01-22 RX ADMIN — OXYCODONE HYDROCHLORIDE 5 MILLIGRAM(S): 5 TABLET ORAL at 16:53

## 2024-01-22 RX ADMIN — OXYCODONE HYDROCHLORIDE 5 MILLIGRAM(S): 5 TABLET ORAL at 06:22

## 2024-01-22 RX ADMIN — Medication 15 MILLIGRAM(S): at 10:55

## 2024-01-22 RX ADMIN — Medication 15 MILLIGRAM(S): at 05:07

## 2024-01-22 RX ADMIN — ENOXAPARIN SODIUM 40 MILLIGRAM(S): 100 INJECTION SUBCUTANEOUS at 10:06

## 2024-01-22 RX ADMIN — Medication 15 MILLIGRAM(S): at 18:15

## 2024-01-22 RX ADMIN — ONDANSETRON 8 MILLIGRAM(S): 8 TABLET, FILM COATED ORAL at 22:37

## 2024-01-22 RX ADMIN — Medication 15 MILLIGRAM(S): at 17:38

## 2024-01-22 RX ADMIN — Medication 400 MILLIGRAM(S): at 16:14

## 2024-01-22 RX ADMIN — PIPERACILLIN AND TAZOBACTAM 100 MILLIGRAM(S): 4; .5 INJECTION, POWDER, LYOPHILIZED, FOR SOLUTION INTRAVENOUS at 11:45

## 2024-01-22 RX ADMIN — OXYCODONE HYDROCHLORIDE 5 MILLIGRAM(S): 5 TABLET ORAL at 00:08

## 2024-01-22 RX ADMIN — PIPERACILLIN AND TAZOBACTAM 100 MILLIGRAM(S): 4; .5 INJECTION, POWDER, LYOPHILIZED, FOR SOLUTION INTRAVENOUS at 23:56

## 2024-01-22 RX ADMIN — ONDANSETRON 8 MILLIGRAM(S): 8 TABLET, FILM COATED ORAL at 05:37

## 2024-01-22 RX ADMIN — Medication 200 MILLIGRAM(S): at 22:54

## 2024-01-22 RX ADMIN — Medication 15 MILLIGRAM(S): at 05:50

## 2024-01-22 RX ADMIN — DEXTROSE MONOHYDRATE, SODIUM CHLORIDE, AND POTASSIUM CHLORIDE 110 MILLILITER(S): 50; .745; 4.5 INJECTION, SOLUTION INTRAVENOUS at 14:15

## 2024-01-22 RX ADMIN — Medication 15 MILLIGRAM(S): at 11:15

## 2024-01-22 RX ADMIN — PIPERACILLIN AND TAZOBACTAM 100 MILLIGRAM(S): 4; .5 INJECTION, POWDER, LYOPHILIZED, FOR SOLUTION INTRAVENOUS at 06:19

## 2024-01-22 RX ADMIN — Medication 400 MILLIGRAM(S): at 06:40

## 2024-01-22 NOTE — PROGRESS NOTE PEDS - SUBJECTIVE AND OBJECTIVE BOX
PEDIATRIC GENERAL SURGERY PROGRESS NOTE    GIA GONZALEZ  |  [id number]      S: Pt continue to complain of nausea and chill. Pain is controlled. Had one watery BM. +flatus. Tmax to 102.9 and tachy to 118.      Denies f/c/n/v.     O:   Vital Signs Last 24 Hrs  T(C): 38.1 (21 Jan 2024 23:18), Max: 39.5 (21 Jan 2024 14:10)  T(F): 100.5 (21 Jan 2024 23:18), Max: 103.1 (21 Jan 2024 14:10)  HR: 118 (21 Jan 2024 22:02) (107 - 147)  BP: 115/64 (21 Jan 2024 22:02) (99/52 - 126/67)  BP(mean): --  RR: 18 (21 Jan 2024 22:02) (16 - 20)  SpO2: 94% (21 Jan 2024 22:02) (94% - 97%)    Parameters below as of 21 Jan 2024 22:02  Patient On (Oxygen Delivery Method): room air        PHYSICAL EXAM:  GENERAL: NAD  HEENT: NC/AT  CHEST/LUNG: Breathing even, unlabored  HEART: Regular rate and rhythm  ABDOMEN: Soft, nondistended. surgical site c/d/i  EXTREMITIES: good distal pulses b/l. bilateral hands swelling (L>R)  NEURO:  No focal deficits                          11.0   17.27 )-----------( 264      ( 21 Jan 2024 09:50 )             32.5     01-21    135  |  102  |  10  ----------------------------<  157<H>  3.8   |  23  |  0.97    Ca    8.2<L>      21 Jan 2024 09:50  Phos  1.8     01-21  Mg     1.60     01-21 01-20-24 @ 07:01  -  01-21-24 @ 07:00  --------------------------------------------------------  IN: 1590 mL / OUT: 1251 mL / NET: 339 mL    01-21-24 @ 07:01  -  01-22-24 @ 00:47  --------------------------------------------------------  IN: 2949.6 mL / OUT: 425 mL / NET: 2524.6 mL        IMAGING STUDIES:     PEDIATRIC GENERAL SURGERY PROGRESS NOTE    GIA GONZALEZ  |  [id number]      S: Pt continue to complain of nausea and chill. Pain is controlled. Had one watery BM. +flatus. Tmax to 102.9 and tachy to 118. Denies f/c/n/v.     O:   Vital Signs Last 24 Hrs  T(C): 38.1 (21 Jan 2024 23:18), Max: 39.5 (21 Jan 2024 14:10)  T(F): 100.5 (21 Jan 2024 23:18), Max: 103.1 (21 Jan 2024 14:10)  HR: 118 (21 Jan 2024 22:02) (107 - 147)  BP: 115/64 (21 Jan 2024 22:02) (99/52 - 126/67)  BP(mean): --  RR: 18 (21 Jan 2024 22:02) (16 - 20)  SpO2: 94% (21 Jan 2024 22:02) (94% - 97%)    Parameters below as of 21 Jan 2024 22:02  Patient On (Oxygen Delivery Method): room air        PHYSICAL EXAM:  GENERAL: NAD  HEENT: NC/AT  CHEST/LUNG: Breathing even, unlabored  HEART: Regular rate and rhythm  ABDOMEN: Soft, nondistended. surgical site c/d/i  EXTREMITIES: good distal pulses b/l. bilateral hands swelling (L>R)  NEURO:  No focal deficits

## 2024-01-22 NOTE — PROGRESS NOTE PEDS - ASSESSMENT
A:  GIA GONZALEZ is a 18y Male with h/o Crohn's disease s/p laparoscopic ileocectomy on 1/19.     Plan:  - Pain control   - OOBAT/ elevation of hands   - Incentive spirometry   - NPO with sips and chips and meds  - Zosyn given fevers, tachy and leukocytosis  - Lovenox till ambulate    Pediatric Surgery  [id number] A:  GIA GONZALEZ is a 18y Male with h/o Crohn's disease s/p laparoscopic ileocectomy on 1/19. Patient febrile overnight and this AM tmax 103.1 with tachycardia to 126bpm.     Plan:  - Pain control   - OOBAT/ elevation of hands   - Incentive spirometry   - NPO with sips and chips and meds  - Zosyn given fevers, tachy and leukocytosis  - Lovenox till ambulate    Pediatric Surgery  [id number]

## 2024-01-22 NOTE — PROGRESS NOTE PEDS - ATTENDING COMMENTS
Pt seen and examined    POD 3 s/p lap-assisted ileocecectomy   Has been febrile and tachycardic, Tmax was 103.1 deg F with HR in 120s at 6 this am  Has some abdominal discomfort  Had 1 loose BM  On exam, NAD  Abdomen mildly distended, incisions c/d/i  CT on 1/21 without any leak  CBC with WBC down to 13.3 from 17.3  Clears today  Repeat labs today

## 2024-01-22 NOTE — PROGRESS NOTE ADULT - SUBJECTIVE AND OBJECTIVE BOX
POST ANESTHESIA EVALUATION    18y Male POSTOP DAY 3    MENTAL STATUS: Patient participation [ x ] Awake     [  ] Arousable     [  ] Sedated    AIRWAY PATENCY: [ x ] Satisfactory  [  ] Other:     Vital Signs Last 24 Hrs  T(C): 37.5 (22 Jan 2024 10:21), Max: 39.5 (21 Jan 2024 14:10)  T(F): 99.5 (22 Jan 2024 10:21), Max: 103.1 (21 Jan 2024 14:10)  HR: 99 (22 Jan 2024 10:21) (92 - 147)  BP: 119/73 (22 Jan 2024 10:21) (113/57 - 126/72)  BP(mean): 88 (22 Jan 2024 10:21) (75 - 88)  RR: 18 (22 Jan 2024 10:21) (16 - 20)  SpO2: 96% (22 Jan 2024 10:21) (94% - 97%)    Parameters below as of 22 Jan 2024 10:21  Patient On (Oxygen Delivery Method): room air      I&O's Summary    21 Jan 2024 07:01  -  22 Jan 2024 07:00  --------------------------------------------------------  IN: 4799.6 mL / OUT: 775 mL / NET: 4024.6 mL    22 Jan 2024 07:01  -  22 Jan 2024 12:33  --------------------------------------------------------  IN: 1285 mL / OUT: 0 mL / NET: 1285 mL          NAUSEA/ VOMITTING:  [ x ] NONE  [  ] CONTROLLED [  ] OTHER     PAIN: [ x ] CONTROLLED WITH CURRENT REGIMEN  [  ] OTHER    [ x ] NO APPARENT ANESTHESIA COMPLICATIONS      Comments: Patient had mild sore throat which is resolved now.

## 2024-01-22 NOTE — CHART NOTE - NSCHARTNOTEFT_GEN_A_CORE
Discussed pt with his primary gastroenterologist (Dr. Quezada) re: fevers, need for steroids  Labs improving appropriately and blood cx are 24hr negative, will start short course of steroids with quick taper:  100mg hydrocortisone TID x 1d, 75mg TID x 1d, 50mg TID x 1d, can convert to PO prednisone when ready

## 2024-01-23 RX ORDER — FAMOTIDINE 10 MG/ML
35 INJECTION INTRAVENOUS EVERY 12 HOURS
Refills: 0 | Status: DISCONTINUED | OUTPATIENT
Start: 2024-01-23 | End: 2024-01-24

## 2024-01-23 RX ORDER — IBUPROFEN 200 MG
400 TABLET ORAL EVERY 6 HOURS
Refills: 0 | Status: DISCONTINUED | OUTPATIENT
Start: 2024-01-23 | End: 2024-01-24

## 2024-01-23 RX ORDER — ACETAMINOPHEN 500 MG
650 TABLET ORAL EVERY 6 HOURS
Refills: 0 | Status: DISCONTINUED | OUTPATIENT
Start: 2024-01-23 | End: 2024-01-24

## 2024-01-23 RX ADMIN — ONDANSETRON 8 MILLIGRAM(S): 8 TABLET, FILM COATED ORAL at 22:24

## 2024-01-23 RX ADMIN — Medication 150 MILLIGRAM(S): at 22:40

## 2024-01-23 RX ADMIN — Medication 150 MILLIGRAM(S): at 15:10

## 2024-01-23 RX ADMIN — PIPERACILLIN AND TAZOBACTAM 100 MILLIGRAM(S): 4; .5 INJECTION, POWDER, LYOPHILIZED, FOR SOLUTION INTRAVENOUS at 06:25

## 2024-01-23 RX ADMIN — ONDANSETRON 8 MILLIGRAM(S): 8 TABLET, FILM COATED ORAL at 05:34

## 2024-01-23 RX ADMIN — OXYCODONE HYDROCHLORIDE 2.5 MILLIGRAM(S): 5 TABLET ORAL at 10:59

## 2024-01-23 RX ADMIN — FAMOTIDINE 35 MILLIGRAM(S): 10 INJECTION INTRAVENOUS at 22:28

## 2024-01-23 RX ADMIN — Medication 15 MILLIGRAM(S): at 10:21

## 2024-01-23 RX ADMIN — Medication 200 MILLIGRAM(S): at 05:46

## 2024-01-23 RX ADMIN — Medication 15 MILLIGRAM(S): at 05:12

## 2024-01-23 RX ADMIN — OXYCODONE HYDROCHLORIDE 5 MILLIGRAM(S): 5 TABLET ORAL at 00:37

## 2024-01-23 RX ADMIN — ONDANSETRON 8 MILLIGRAM(S): 8 TABLET, FILM COATED ORAL at 14:32

## 2024-01-23 RX ADMIN — Medication 15 MILLIGRAM(S): at 06:19

## 2024-01-23 RX ADMIN — OXYCODONE HYDROCHLORIDE 5 MILLIGRAM(S): 5 TABLET ORAL at 23:20

## 2024-01-23 RX ADMIN — ENOXAPARIN SODIUM 40 MILLIGRAM(S): 100 INJECTION SUBCUTANEOUS at 10:21

## 2024-01-23 RX ADMIN — OXYCODONE HYDROCHLORIDE 5 MILLIGRAM(S): 5 TABLET ORAL at 22:28

## 2024-01-23 NOTE — PROGRESS NOTE PEDS - ASSESSMENT
A:  GIA GONZALEZ is a 18y Male with h/o Crohn's disease s/p laparoscopic ileocectomy on 1/19.     Plan:  - CLD/off mIVF  - zosyn  - started steroid: 100mg hydrocortisone TID x 1d, 75mg TID x 1d, 50mg TID x 1d, can convert to PO prednisone when ready   - Pain control   - OOBAT/ elevation of hands   - Incentive spirometry   - Lovenox till ambulate    Pediatric Surgery  [id number] A:  GIA GONZALEZ is a 18y Male with h/o Crohn's disease s/p laparoscopic ileocectomy on 1/19.     Plan:  - Advanced to regular diet 01/23. IVL  - zosyn dc'd because BCx negative.  - Steroid taper ordered 100mg hydrocortisone TID x 1d 01/22, 75mg TID x 1d 01/23, 50mg TID x 1d 01/24, will convert to PO prednisone 01/24 if tolerating regular  - Pain control   - OOBAT/ elevation of hands   - Incentive spirometry   - Lovenox till ambulate  - Possible discharge 01/24 if tolerating    Pediatric Surgery  [id number]

## 2024-01-23 NOTE — PROGRESS NOTE PEDS - ATTENDING COMMENTS
Pt seen and examined    POD 4 s/p lap-assisted ileocecectomy   Tolerating clears   Tmax 38.3 yest afternoon  Had 1 loose BM  On exam, NAD  Abdomen nondistended, incisions c/d/i  Reg diet today  Steroid taper ongoing  Possible dispo home tomorrow

## 2024-01-23 NOTE — PROGRESS NOTE PEDS - SUBJECTIVE AND OBJECTIVE BOX
PEDIATRIC GENERAL SURGERY PROGRESS NOTE    GIA GONZALEZ  |  [id number]      S: JORDIN. Pt reports nausea is improving and pain is controlled. Swelling of hands slightly better      O:   Vital Signs Last 24 Hrs  T(C): 36.3 (22 Jan 2024 22:12), Max: 39.5 (22 Jan 2024 05:50)  T(F): 97.3 (22 Jan 2024 22:12), Max: 103.1 (22 Jan 2024 05:50)  HR: 68 (22 Jan 2024 22:12) (68 - 126)  BP: 110/70 (22 Jan 2024 22:12) (110/70 - 126/72)  BP(mean): 83 (22 Jan 2024 18:25) (75 - 88)  RR: 18 (22 Jan 2024 22:12) (18 - 19)  SpO2: 95% (22 Jan 2024 22:12) (95% - 97%)    Parameters below as of 22 Jan 2024 22:12  Patient On (Oxygen Delivery Method): room air        PHYSICAL EXAM:  GENERAL: NAD, well-groomed, well-developed  HEENT: NC/AT  CHEST/LUNG: Breathing even, unlabored  HEART: Regular rate and rhythm  ABDOMEN: Soft, nondistended. incision site TTP, but c/d/i  EXTREMITIES: good distal pulses b/l   NEURO:  No focal deficits                          10.8   13.34 )-----------( 194      ( 22 Jan 2024 11:26 )             32.2     01-22    134<L>  |  103  |  8   ----------------------------<  103<H>  3.9   |  20<L>  |  0.95    Ca    8.2<L>      22 Jan 2024 11:26  Phos  3.1     01-22  Mg     1.60     01-22 01-21-24 @ 07:01  -  01-22-24 @ 07:00  --------------------------------------------------------  IN: 4799.6 mL / OUT: 775 mL / NET: 4024.6 mL    01-22-24 @ 07:01  -  01-23-24 @ 00:56  --------------------------------------------------------  IN: 3007 mL / OUT: 0 mL / NET: 3007 mL        IMAGING STUDIES:     PEDIATRIC GENERAL SURGERY PROGRESS NOTE    GIA GONZALEZ  |  [id number]      S: NALISSAON. Pt reports nausea is improving and pain is controlled. Swelling of hands slightly better. Tolerating CLD (water, gatorade)    O:   Vital Signs Last 24 Hrs  T(C): 36.3 (22 Jan 2024 22:12), Max: 39.5 (22 Jan 2024 05:50)  T(F): 97.3 (22 Jan 2024 22:12), Max: 103.1 (22 Jan 2024 05:50)  HR: 68 (22 Jan 2024 22:12) (68 - 126)  BP: 110/70 (22 Jan 2024 22:12) (110/70 - 126/72)  BP(mean): 83 (22 Jan 2024 18:25) (75 - 88)  RR: 18 (22 Jan 2024 22:12) (18 - 19)  SpO2: 95% (22 Jan 2024 22:12) (95% - 97%)    Parameters below as of 22 Jan 2024 22:12  Patient On (Oxygen Delivery Method): room air    PHYSICAL EXAM:  GENERAL: NAD, well-groomed, well-developed  HEENT: NC/AT  CHEST/LUNG: Breathing even, unlabored  HEART: Regular rate and rhythm  ABDOMEN: Soft, nondistended. Incision site TTP (improving), but c/d/i  EXTREMITIES: good distal pulses b/l   NEURO:  No focal deficits                          10.8   13.34 )-----------( 194      ( 22 Jan 2024 11:26 )             32.2     01-22    134<L>  |  103  |  8   ----------------------------<  103<H>  3.9   |  20<L>  |  0.95    Ca    8.2<L>      22 Jan 2024 11:26  Phos  3.1     01-22  Mg     1.60     01-22 01-21-24 @ 07:01  -  01-22-24 @ 07:00  --------------------------------------------------------  IN: 4799.6 mL / OUT: 775 mL / NET: 4024.6 mL    01-22-24 @ 07:01 - 01-23-24 @ 00:56  --------------------------------------------------------  IN: 3007 mL / OUT: 0 mL / NET: 3007 mL        IMAGING STUDIES:

## 2024-01-24 ENCOUNTER — TRANSCRIPTION ENCOUNTER (OUTPATIENT)
Age: 19
End: 2024-01-24

## 2024-01-24 VITALS
HEART RATE: 55 BPM | RESPIRATION RATE: 18 BRPM | DIASTOLIC BLOOD PRESSURE: 70 MMHG | OXYGEN SATURATION: 97 % | SYSTOLIC BLOOD PRESSURE: 130 MMHG | TEMPERATURE: 98 F

## 2024-01-24 RX ORDER — PANTOPRAZOLE SODIUM 20 MG/1
1 TABLET, DELAYED RELEASE ORAL
Refills: 0 | DISCHARGE

## 2024-01-24 RX ORDER — IBUPROFEN 200 MG
20 TABLET ORAL
Qty: 0 | Refills: 0 | DISCHARGE

## 2024-01-24 RX ORDER — ACETAMINOPHEN 500 MG
20 TABLET ORAL
Qty: 0 | Refills: 0 | DISCHARGE

## 2024-01-24 RX ORDER — PANTOPRAZOLE SODIUM 20 MG/1
1 TABLET, DELAYED RELEASE ORAL
Qty: 14 | Refills: 0
Start: 2024-01-24 | End: 2024-02-06

## 2024-01-24 RX ADMIN — ONDANSETRON 8 MILLIGRAM(S): 8 TABLET, FILM COATED ORAL at 06:04

## 2024-01-24 RX ADMIN — Medication 20 MILLIGRAM(S): at 10:24

## 2024-01-24 RX ADMIN — FAMOTIDINE 35 MILLIGRAM(S): 10 INJECTION INTRAVENOUS at 10:24

## 2024-01-24 RX ADMIN — Medication 100 MILLIGRAM(S): at 06:22

## 2024-01-24 NOTE — DISCHARGE NOTE NURSING/CASE MANAGEMENT/SOCIAL WORK - NSCORESITESY/N_GEN_A_CORE_RD
"    Chief Complaint   Patient presents with   • Well Child     1 year exam    • Immunizations     mmr verito hep a    • Labs Only     1 year labs        Deacon Pako Monroe is a 12 m.o. male  who is brought in for this well child visit.    History was provided by the mother.    The following portions of the patient's history were reviewed and updated as appropriate: allergies, current medications, past family history, past medical history, past social history, past surgical history and problem list.    No current outpatient medications on file.     No current facility-administered medications for this visit.        No Known Allergies    History reviewed. No pertinent past medical history.    Current Issues:  Current concerns include none.  Pt is doing well.    Review of Nutrition:  Current diet: cow's milk, formula (transitioning off formula) and solids (table foods)  Current feeding pattern: Using bottle twice daily.  Otherwise all cup.  Eating variety of table foods.  Discussed weaning off bottle.  Difficulties with feeding? no  Voiding well  Stooling well    Social Screening:  Current child-care arrangements: in home: primary caregiver is father, grandmother and mother  Secondhand Smoke Exposure? no  Guns in home discussed firearm safety  Car Seat (backwards, back seat) yes  Smoke Detectors  yes    Developmental History:  Says mama and teresita specifically:  yes  Has 2-3 words:   yes  Wavess bye-bye:  yes  Exhibit stranger anxiety:   yes  Please peek-a-pyle and pat-a-cake:  yes  Can do pincer grasp of object:  yes  [place] 2 objects together:  yes  Follow simple directions like \" the toy\":  yes  Cruises or walks:  Yes- cruises           Physical Exam:    Ht 80.6 cm (31.75\")   Wt 10.6 kg (23 lb 7 oz)   HC 47.6 cm (18.75\")   BMI 16.35 kg/m²     Growth parameters are noted and are appropriate for age.     Physical Exam  Vitals signs reviewed.   Constitutional:       General: He is active. He is not in acute " distress.     Appearance: Normal appearance. He is well-developed and normal weight.   HENT:      Head: Normocephalic and atraumatic.      Right Ear: Tympanic membrane, ear canal and external ear normal.      Left Ear: Tympanic membrane, ear canal and external ear normal.      Nose: Nose normal.      Mouth/Throat:      Mouth: Mucous membranes are moist.      Pharynx: Oropharynx is clear. No oropharyngeal exudate or posterior oropharyngeal erythema.   Eyes:      General: Red reflex is present bilaterally.      Extraocular Movements: Extraocular movements intact.      Conjunctiva/sclera: Conjunctivae normal.      Pupils: Pupils are equal, round, and reactive to light.   Neck:      Musculoskeletal: Normal range of motion and neck supple.   Cardiovascular:      Rate and Rhythm: Normal rate and regular rhythm.      Pulses: Normal pulses.      Heart sounds: Normal heart sounds. No murmur.   Pulmonary:      Effort: Pulmonary effort is normal. No respiratory distress.      Breath sounds: Normal breath sounds. No decreased air movement.   Abdominal:      General: Bowel sounds are normal. There is no distension.      Palpations: Abdomen is soft. There is no mass.      Tenderness: There is no abdominal tenderness.   Genitourinary:     Penis: Normal and circumcised.       Scrotum/Testes: Normal.   Musculoskeletal: Normal range of motion.         General: No swelling, tenderness or deformity.      Comments: Full and equal hip ROM   Lymphadenopathy:      Cervical: No cervical adenopathy.   Skin:     General: Skin is warm.      Capillary Refill: Capillary refill takes less than 2 seconds.      Findings: No rash.   Neurological:      General: No focal deficit present.      Mental Status: He is alert.      Deep Tendon Reflexes: Reflexes normal.               Healthy 12 m.o. well baby.    1. Anticipatory guidance discussed.  Gave handout on well-child issues at this age.    Parents were instructed to keep chemicals, , and  No medications locked up and out of reach.  They should keep a poison control sticker handy and call poison control it the child ingests anything.  The child should be playing only with large toys.  Plastic bags should be ripped up and thrown out.  Outlets should be covered.  Stairs should be gated as needed.  Unsafe foods include popcorn, peanuts, candy, gum, hot dogs, grapes, and raw carrots.  The child is to be supervised anytime he or she is in water.  Sunscreen should be used as needed.  General  burn safety include setting hot water heater to 120°, matches and lighters should be locked up, candles should not be left burning, smoke alarms should be checked regularly, and a fire safety plan in place.  Guns in the home should be unloaded and locked up. The child should be in an approved car seat, in the back seat, suggest rear facing until age 2, then forward facing, but not in the front seat with an airbag.  Recommend daily brushing of teeth but no fluoride toothpaste at this age.  Recommend first dental visit.  Recommend no screen time at this age.  Encouraged book sharing in the home.    2. Development: appropriate for age    3.  Vaccinations:  Pt is due for 12 mo vaccine today.  MMR#1, Varicella #1, Hep A#1  Vaccines discussed prior to administration today.  Family counseled regarding vaccines by the physician and all questions were answered.    Orders Placed This Encounter   Procedures   • MMR Vaccine Subcutaneous   • Varicella Vaccine Subcutaneous   • Hepatitis A Vaccine Pediatric / Adolescent 2 Dose IM   • CBC (No Diff)     Standing Status:   Future     Standing Expiration Date:   1/11/2022   • Lead, Blood, Filter Paper     Standing Status:   Future     Standing Expiration Date:   1/11/2022         Return in about 3 months (around 4/11/2021) for 15 mo check up.

## 2024-01-24 NOTE — DISCHARGE NOTE PROVIDER - NSDCMRMEDTOKEN_GEN_ALL_CORE_FT
acetaminophen 160 mg/5 mL oral suspension: 20 milliliter(s) orally every 6 hours as needed for  pain  ibuprofen 100 mg/5 mL oral suspension: 20 milliliter(s) orally every 6 hours  pantoprazole 20 mg oral delayed release tablet: 1 tab(s) orally once a day  predniSONE 10 mg oral tablet: 1 tab(s) orally take one 10 mg tablet in AM on 1/26 to complete taper  predniSONE 20 mg oral tablet: 1 tab(s) orally once a day prednisone taper: take 1 20 mg tablet at night on 1/24, one 20 mg tablet 1/25 morning.

## 2024-01-24 NOTE — DISCHARGE NOTE NURSING/CASE MANAGEMENT/SOCIAL WORK - PATIENT PORTAL LINK FT
You can access the FollowMyHealth Patient Portal offered by [facility] by registering at the following website: [email or web address]. By joining [email or web address]’s FollowMyHealth portal, you will also be able to view your health information using other applications (apps) compatible with our system.

## 2024-01-24 NOTE — PROGRESS NOTE PEDS - SUBJECTIVE AND OBJECTIVE BOX
Pediatric Surgery Daily Progress Note  =====================================================    SUBJECTIVE: Patient reports that they're feeling well. Fevers have fully improved. Abdominal pain is minimal and only with motion. Tolerating regular diet.    --------------------------------------------------------------------------------------  VITAL SIGNS:  T(C): 36.7 (01-24-24 @ 01:52), Max: 37 (01-23-24 @ 17:39)  HR: 68 (01-24-24 @ 01:52) (66 - 79)  BP: 118/75 (01-24-24 @ 01:52) (116/72 - 133/78)  RR: 18 (01-24-24 @ 01:52) (18 - 19)  SpO2: 97% (01-24-24 @ 01:52) (97% - 99%)  --------------------------------------------------------------------------------------    EXAM    General: NAD, resting in bed comfortably  Cardiac: Regular rate, warm and well perfused  Respiratory: Nonlabored respirations, normal cw expansion, on RA  Abdomen: Soft, nondistended, nontender, incisions c/d/i   Extremities: Warm, well perfused      --------------------------------------------------------------------------------------     Pediatric Surgery Daily Progress Note  =====================================================    SUBJECTIVE: Patient reports that they're feeling well. Fevers resolved. Abdominal pain is minimal Tolerating regular diet. Tapering steroids. Having urine and BMs 2-3x yesterday. More formed. Urine light yellow/clear. Difficult to record per pt. Tolerating regular diet.     --------------------------------------------------------------------------------------  VITAL SIGNS:  T(C): 36.7 (01-24-24 @ 01:52), Max: 37 (01-23-24 @ 17:39)  HR: 68 (01-24-24 @ 01:52) (66 - 79)  BP: 118/75 (01-24-24 @ 01:52) (116/72 - 133/78)  RR: 18 (01-24-24 @ 01:52) (18 - 19)  SpO2: 97% (01-24-24 @ 01:52) (97% - 99%)  --------------------------------------------------------------------------------------    EXAM    General: NAD, resting in bed comfortably  Cardiac: Regular rate, warm and well perfused  Respiratory: Nonlabored respirations, normal cw expansion, on RA  Abdomen: Soft, nondistended, nontender, incisions c/d/i   Extremities: Warm, well perfused  --------------------------------------------------------------------------------------

## 2024-01-24 NOTE — DISCHARGE NOTE PROVIDER - CARE PROVIDER_API CALL
Jake Liz  Pediatric Surgery  [address] [place] [address], [address]  [place] [address]  Phone: [phone]  Fax: [phone]  Follow Up Time: 2 weeks

## 2024-01-24 NOTE — DISCHARGE NOTE PROVIDER - NSDCFUADDINST_GEN_ALL_CORE_FT
PAIN: You may continue to take Acetaminophen (Tylenol) and Ibuprofen (Advil, Motrin) over the counter for pain. You can alternate the two medications, giving one every 3 hours. We recommend taking the medications around the clock for the first few days at home after surgery. Then you can start taking them only as needed for pain.  WOUND CARE:  You should allow warm soapy water to run down the wound in the shower. You should not need to scrub the area. You do not have any stitches that need to be removed. If you have glue or steri-strips on your wound, it will fall off on its own.  BATHING: Please do not soak or submerge the wound in water (bath, swimming) for 10 days after your surgery.  ACTIVITY: No heavy lifting, straining, or vigorous activity until your follow-up appointment in 2 weeks.   NOTIFY US IF: Your child has any bleeding that does not stop, any pus draining from his/her wound(s), any fever (over 100.5 F) or chills, persistent nausea/vomiting, persistent diarrhea, or if his/her pain is not controlled on their discharge pain medications.  FOLLOW-UP: Please call the office and make an appointment to follow up with Dr. Liz in 2 weeks.  Please follow up with your primary care physician in 1-2 weeks regarding your hospitalization.       **PLEASE NOTE OUR CORRECT CLINIC ADDRESS IS [address] [place] [address], [place] [address], [place] [address]. OUR CORRECT PHONE NUMBER IS [phone].**

## 2024-01-24 NOTE — DISCHARGE NOTE PROVIDER - NSDCCPCAREPLAN_GEN_ALL_CORE_FT
PRINCIPAL DISCHARGE DIAGNOSIS  Diagnosis: Crohn's disease, unspecified, without complications  Assessment and Plan of Treatment:

## 2024-01-24 NOTE — PROGRESS NOTE PEDS - ATTENDING COMMENTS
Pt seen and examined    POD 5 s/p lap-assisted ileocecectomy   Tolerating reg diet  Had 2 BMs, which are more formed  On exam, NAD  Abdomen nondistended, incisions c/d/i  Reg diet   Steroid taper   Plan for discharge home today

## 2024-01-24 NOTE — DISCHARGE NOTE NURSING/CASE MANAGEMENT/SOCIAL WORK - NSDCPEFALRISK_GEN_ALL_CORE
For information on Fall & Injury Prevention, visit: [email or web address] OR  [email or web address] OR  https://www.cdc.gov/steadi/patient.html

## 2024-01-24 NOTE — DISCHARGE NOTE PROVIDER - HOSPITAL COURSE
GIA GONZALEZ is a 18y Male with hx chrons who was admitted to [facility] for [place] ileocecectomy with Dr. Liz on 1/19. He received zosyn x24 hours post op and lovenox prophylaxis through POD4. He was transitioned to CLD on POD1, had poor po intake and remained on MIVF. Pt febrile on POD 2 with tachycardia, febrile workup including cbc, cmp, ua, rvp, bcx, ucx complete notable for wbc 17.2. pt was placed on zosyn for 48 r/o until cultures negative at 24 hours. He received hydrocortisone on POD3, and continued with taper which he will complete with PO prednisone upon dispo. Pt was transitioned to reg diet on POD4 which he tolerated well, pain was well controlled, remained afebrile and hemodynamically stable. He was deemed stable for discharge on POD 5, and will plan to f/u with Dr. Liz in two weeks.   At time of discharge, pt was tolerating a regular diet, voiding/stooling independently, ambulating, and pain was well-controlled. Patient and family felt ready for discharge.

## 2024-01-26 ENCOUNTER — INPATIENT (INPATIENT)
Age: 19
LOS: 0 days | Discharge: HOME | End: 2024-01-27
Attending: SURGERY | Admitting: SURGERY
Payer: COMMERCIAL

## 2024-01-26 VITALS
HEART RATE: 122 BPM | OXYGEN SATURATION: 98 % | WEIGHT: 155.43 LBS | DIASTOLIC BLOOD PRESSURE: 55 MMHG | RESPIRATION RATE: 18 BRPM | SYSTOLIC BLOOD PRESSURE: 96 MMHG | TEMPERATURE: 98 F

## 2024-01-26 DIAGNOSIS — Z98.890 OTHER SPECIFIED POSTPROCEDURAL STATES: Chronic | ICD-10-CM

## 2024-01-26 DIAGNOSIS — R18.8 OTHER ASCITES: ICD-10-CM

## 2024-01-26 PROBLEM — R06.83 SNORING: Chronic | Status: ACTIVE | Noted: 2024-01-18

## 2024-01-26 PROBLEM — K50.90 CROHN'S DISEASE, UNSPECIFIED, WITHOUT COMPLICATIONS: Chronic | Status: ACTIVE | Noted: 2024-01-18

## 2024-01-26 LAB
ALBUMIN SERPL ELPH-MCNC: 3.1 G/DL — LOW (ref 3.3–5)
ALP SERPL-CCNC: 88 U/L — SIGNIFICANT CHANGE UP (ref 60–270)
ALT FLD-CCNC: 85 U/L — HIGH (ref 4–41)
ANION GAP SERPL CALC-SCNC: 10 MMOL/L — SIGNIFICANT CHANGE UP (ref 7–14)
ANION GAP SERPL CALC-SCNC: 9 MMOL/L — SIGNIFICANT CHANGE UP (ref 7–14)
APPEARANCE UR: CLEAR — SIGNIFICANT CHANGE UP
AST SERPL-CCNC: 36 U/L — SIGNIFICANT CHANGE UP (ref 4–40)
B PERT DNA SPEC QL NAA+PROBE: SIGNIFICANT CHANGE UP
B PERT+PARAPERT DNA PNL SPEC NAA+PROBE: SIGNIFICANT CHANGE UP
BACTERIA # UR AUTO: NEGATIVE /HPF — SIGNIFICANT CHANGE UP
BASE EXCESS BLDV CALC-SCNC: 3.9 MMOL/L — HIGH (ref -2–3)
BASOPHILS # BLD AUTO: 0.09 K/UL — SIGNIFICANT CHANGE UP (ref 0–0.2)
BASOPHILS NFR BLD AUTO: 0.6 % — SIGNIFICANT CHANGE UP (ref 0–2)
BILIRUB SERPL-MCNC: 0.5 MG/DL — SIGNIFICANT CHANGE UP (ref 0.2–1.2)
BILIRUB UR-MCNC: NEGATIVE — SIGNIFICANT CHANGE UP
BLOOD GAS VENOUS COMPREHENSIVE RESULT: SIGNIFICANT CHANGE UP
BORDETELLA PARAPERTUSSIS (RAPRVP): SIGNIFICANT CHANGE UP
BUN SERPL-MCNC: 10 MG/DL — SIGNIFICANT CHANGE UP (ref 7–23)
BUN SERPL-MCNC: 9 MG/DL — SIGNIFICANT CHANGE UP (ref 7–23)
C PNEUM DNA SPEC QL NAA+PROBE: SIGNIFICANT CHANGE UP
CALCIUM SERPL-MCNC: 8.1 MG/DL — LOW (ref 8.4–10.5)
CALCIUM SERPL-MCNC: 8.4 MG/DL — SIGNIFICANT CHANGE UP (ref 8.4–10.5)
CAST: 0 /LPF — SIGNIFICANT CHANGE UP (ref 0–4)
CHLORIDE BLDV-SCNC: 100 MMOL/L — SIGNIFICANT CHANGE UP (ref 96–108)
CHLORIDE SERPL-SCNC: 98 MMOL/L — SIGNIFICANT CHANGE UP (ref 98–107)
CHLORIDE SERPL-SCNC: 98 MMOL/L — SIGNIFICANT CHANGE UP (ref 98–107)
CO2 BLDV-SCNC: 31.2 MMOL/L — HIGH (ref 22–26)
CO2 SERPL-SCNC: 27 MMOL/L — SIGNIFICANT CHANGE UP (ref 22–31)
CO2 SERPL-SCNC: 28 MMOL/L — SIGNIFICANT CHANGE UP (ref 22–31)
COLOR SPEC: YELLOW — SIGNIFICANT CHANGE UP
CREAT SERPL-MCNC: 0.83 MG/DL — SIGNIFICANT CHANGE UP (ref 0.5–1.3)
CREAT SERPL-MCNC: 0.89 MG/DL — SIGNIFICANT CHANGE UP (ref 0.5–1.3)
CRP SERPL-MCNC: 103.8 MG/L — HIGH
CULTURE RESULTS: SIGNIFICANT CHANGE UP
DIFF PNL FLD: NEGATIVE — SIGNIFICANT CHANGE UP
EGFR: 127 ML/MIN/1.73M2 — SIGNIFICANT CHANGE UP
EGFR: 129 ML/MIN/1.73M2 — SIGNIFICANT CHANGE UP
EOSINOPHIL # BLD AUTO: 0.02 K/UL — SIGNIFICANT CHANGE UP (ref 0–0.5)
EOSINOPHIL NFR BLD AUTO: 0.1 % — SIGNIFICANT CHANGE UP (ref 0–6)
FLUAV SUBTYP SPEC NAA+PROBE: SIGNIFICANT CHANGE UP
FLUBV RNA SPEC QL NAA+PROBE: SIGNIFICANT CHANGE UP
GAS PNL BLDV: 132 MMOL/L — LOW (ref 136–145)
GLUCOSE BLDV-MCNC: 95 MG/DL — SIGNIFICANT CHANGE UP (ref 70–99)
GLUCOSE SERPL-MCNC: 92 MG/DL — SIGNIFICANT CHANGE UP (ref 70–99)
GLUCOSE SERPL-MCNC: 98 MG/DL — SIGNIFICANT CHANGE UP (ref 70–99)
GLUCOSE UR QL: NEGATIVE MG/DL — SIGNIFICANT CHANGE UP
HADV DNA SPEC QL NAA+PROBE: SIGNIFICANT CHANGE UP
HCO3 BLDV-SCNC: 30 MMOL/L — HIGH (ref 22–29)
HCOV 229E RNA SPEC QL NAA+PROBE: SIGNIFICANT CHANGE UP
HCOV HKU1 RNA SPEC QL NAA+PROBE: SIGNIFICANT CHANGE UP
HCOV NL63 RNA SPEC QL NAA+PROBE: SIGNIFICANT CHANGE UP
HCOV OC43 RNA SPEC QL NAA+PROBE: SIGNIFICANT CHANGE UP
HCT VFR BLD CALC: 34 % — LOW (ref 39–50)
HCT VFR BLDA CALC: 35 % — LOW (ref 39–51)
HGB BLD CALC-MCNC: 11.8 G/DL — LOW (ref 12.6–17.4)
HGB BLD-MCNC: 11.9 G/DL — LOW (ref 13–17)
HMPV RNA SPEC QL NAA+PROBE: SIGNIFICANT CHANGE UP
HPIV1 RNA SPEC QL NAA+PROBE: SIGNIFICANT CHANGE UP
HPIV2 RNA SPEC QL NAA+PROBE: SIGNIFICANT CHANGE UP
HPIV3 RNA SPEC QL NAA+PROBE: SIGNIFICANT CHANGE UP
HPIV4 RNA SPEC QL NAA+PROBE: SIGNIFICANT CHANGE UP
IANC: 10.14 K/UL — HIGH (ref 1.8–7.4)
IMM GRANULOCYTES NFR BLD AUTO: 4.9 % — HIGH (ref 0–0.9)
KETONES UR-MCNC: NEGATIVE MG/DL — SIGNIFICANT CHANGE UP
LACTATE BLDV-MCNC: 1.2 MMOL/L — SIGNIFICANT CHANGE UP (ref 0.5–2)
LEUKOCYTE ESTERASE UR-ACNC: NEGATIVE — SIGNIFICANT CHANGE UP
LYMPHOCYTES # BLD AUTO: 1.32 K/UL — SIGNIFICANT CHANGE UP (ref 1–3.3)
LYMPHOCYTES # BLD AUTO: 9.2 % — LOW (ref 13–44)
M PNEUMO DNA SPEC QL NAA+PROBE: SIGNIFICANT CHANGE UP
MAGNESIUM SERPL-MCNC: 2 MG/DL — SIGNIFICANT CHANGE UP (ref 1.6–2.6)
MCHC RBC-ENTMCNC: 28.4 PG — SIGNIFICANT CHANGE UP (ref 27–34)
MCHC RBC-ENTMCNC: 35 GM/DL — SIGNIFICANT CHANGE UP (ref 32–36)
MCV RBC AUTO: 81.1 FL — SIGNIFICANT CHANGE UP (ref 80–100)
MONOCYTES # BLD AUTO: 2.14 K/UL — HIGH (ref 0–0.9)
MONOCYTES NFR BLD AUTO: 14.8 % — HIGH (ref 2–14)
NEUTROPHILS # BLD AUTO: 10.14 K/UL — HIGH (ref 1.8–7.4)
NEUTROPHILS NFR BLD AUTO: 70.4 % — SIGNIFICANT CHANGE UP (ref 43–77)
NITRITE UR-MCNC: NEGATIVE — SIGNIFICANT CHANGE UP
NRBC # BLD: 0 /100 WBCS — SIGNIFICANT CHANGE UP (ref 0–0)
NRBC # FLD: 0 K/UL — SIGNIFICANT CHANGE UP (ref 0–0)
PCO2 BLDV: 49 MMHG — SIGNIFICANT CHANGE UP (ref 42–55)
PH BLDV: 7.39 — SIGNIFICANT CHANGE UP (ref 7.32–7.43)
PH UR: 6 — SIGNIFICANT CHANGE UP (ref 5–8)
PHOSPHATE SERPL-MCNC: 3.3 MG/DL — SIGNIFICANT CHANGE UP (ref 2.5–4.5)
PLATELET # BLD AUTO: 294 K/UL — SIGNIFICANT CHANGE UP (ref 150–400)
PO2 BLDV: 28 MMHG — SIGNIFICANT CHANGE UP (ref 25–45)
POTASSIUM BLDV-SCNC: 2.9 MMOL/L — CRITICAL LOW (ref 3.5–5.1)
POTASSIUM SERPL-MCNC: 3 MMOL/L — LOW (ref 3.5–5.3)
POTASSIUM SERPL-MCNC: 3.4 MMOL/L — LOW (ref 3.5–5.3)
POTASSIUM SERPL-SCNC: 3 MMOL/L — LOW (ref 3.5–5.3)
POTASSIUM SERPL-SCNC: 3.4 MMOL/L — LOW (ref 3.5–5.3)
PROCALCITONIN SERPL-MCNC: 0.23 NG/ML — HIGH (ref 0.02–0.1)
PROT SERPL-MCNC: 6.7 G/DL — SIGNIFICANT CHANGE UP (ref 6–8.3)
PROT UR-MCNC: 30 MG/DL
RAPID RVP RESULT: SIGNIFICANT CHANGE UP
RBC # BLD: 4.19 M/UL — LOW (ref 4.2–5.8)
RBC # FLD: 13.2 % — SIGNIFICANT CHANGE UP (ref 10.3–14.5)
RBC CASTS # UR COMP ASSIST: 2 /HPF — SIGNIFICANT CHANGE UP (ref 0–4)
RSV RNA SPEC QL NAA+PROBE: SIGNIFICANT CHANGE UP
RV+EV RNA SPEC QL NAA+PROBE: SIGNIFICANT CHANGE UP
SAO2 % BLDV: 36.6 % — LOW (ref 67–88)
SARS-COV-2 RNA SPEC QL NAA+PROBE: SIGNIFICANT CHANGE UP
SODIUM SERPL-SCNC: 134 MMOL/L — LOW (ref 135–145)
SODIUM SERPL-SCNC: 136 MMOL/L — SIGNIFICANT CHANGE UP (ref 135–145)
SP GR SPEC: 1.03 — HIGH (ref 1–1.03)
SPECIMEN SOURCE: SIGNIFICANT CHANGE UP
SQUAMOUS # UR AUTO: 2 /HPF — SIGNIFICANT CHANGE UP (ref 0–5)
UROBILINOGEN FLD QL: 1 MG/DL — SIGNIFICANT CHANGE UP (ref 0.2–1)
WBC # BLD: 14.42 K/UL — HIGH (ref 3.8–10.5)
WBC # FLD AUTO: 14.42 K/UL — HIGH (ref 3.8–10.5)
WBC UR QL: 2 /HPF — SIGNIFICANT CHANGE UP (ref 0–5)

## 2024-01-26 PROCEDURE — 44205 LAP COLECTOMY PART W/ILEUM: CPT

## 2024-01-26 PROCEDURE — G1004: CPT

## 2024-01-26 PROCEDURE — 74177 CT ABD & PELVIS W/CONTRAST: CPT | Mod: 26,ME

## 2024-01-26 PROCEDURE — 71046 X-RAY EXAM CHEST 2 VIEWS: CPT | Mod: 26

## 2024-01-26 PROCEDURE — 99222 1ST HOSP IP/OBS MODERATE 55: CPT

## 2024-01-26 PROCEDURE — 74019 RADEX ABDOMEN 2 VIEWS: CPT | Mod: 26

## 2024-01-26 PROCEDURE — 99291 CRITICAL CARE FIRST HOUR: CPT

## 2024-01-26 RX ORDER — SODIUM CHLORIDE 9 MG/ML
1000 INJECTION INTRAMUSCULAR; INTRAVENOUS; SUBCUTANEOUS ONCE
Refills: 0 | Status: COMPLETED | OUTPATIENT
Start: 2024-01-26 | End: 2024-01-26

## 2024-01-26 RX ORDER — ACETAMINOPHEN 500 MG
750 TABLET ORAL ONCE
Refills: 0 | Status: COMPLETED | OUTPATIENT
Start: 2024-01-26 | End: 2024-01-26

## 2024-01-26 RX ORDER — IBUPROFEN 200 MG
400 TABLET ORAL EVERY 6 HOURS
Refills: 0 | Status: DISCONTINUED | OUTPATIENT
Start: 2024-01-26 | End: 2024-01-27

## 2024-01-26 RX ORDER — DEXTROSE MONOHYDRATE, SODIUM CHLORIDE, AND POTASSIUM CHLORIDE 50; .745; 4.5 G/1000ML; G/1000ML; G/1000ML
1000 INJECTION, SOLUTION INTRAVENOUS
Refills: 0 | Status: DISCONTINUED | OUTPATIENT
Start: 2024-01-26 | End: 2024-01-27

## 2024-01-26 RX ORDER — PIPERACILLIN AND TAZOBACTAM 4; .5 G/20ML; G/20ML
3000 INJECTION, POWDER, LYOPHILIZED, FOR SOLUTION INTRAVENOUS EVERY 6 HOURS
Refills: 0 | Status: DISCONTINUED | OUTPATIENT
Start: 2024-01-26 | End: 2024-01-27

## 2024-01-26 RX ORDER — PIPERACILLIN AND TAZOBACTAM 4; .5 G/20ML; G/20ML
3000 INJECTION, POWDER, LYOPHILIZED, FOR SOLUTION INTRAVENOUS ONCE
Refills: 0 | Status: COMPLETED | OUTPATIENT
Start: 2024-01-26 | End: 2024-01-26

## 2024-01-26 RX ORDER — POTASSIUM CHLORIDE 20 MEQ
10 PACKET (EA) ORAL ONCE
Refills: 0 | Status: COMPLETED | OUTPATIENT
Start: 2024-01-26 | End: 2024-01-26

## 2024-01-26 RX ORDER — ONDANSETRON 8 MG/1
4 TABLET, FILM COATED ORAL ONCE
Refills: 0 | Status: COMPLETED | OUTPATIENT
Start: 2024-01-26 | End: 2024-01-26

## 2024-01-26 RX ADMIN — Medication 50 MILLIEQUIVALENT(S): at 14:50

## 2024-01-26 RX ADMIN — PIPERACILLIN AND TAZOBACTAM 100 MILLIGRAM(S): 4; .5 INJECTION, POWDER, LYOPHILIZED, FOR SOLUTION INTRAVENOUS at 12:59

## 2024-01-26 RX ADMIN — ONDANSETRON 4 MILLIGRAM(S): 8 TABLET, FILM COATED ORAL at 12:29

## 2024-01-26 RX ADMIN — DEXTROSE MONOHYDRATE, SODIUM CHLORIDE, AND POTASSIUM CHLORIDE 15 MILLILITER(S): 50; .745; 4.5 INJECTION, SOLUTION INTRAVENOUS at 21:33

## 2024-01-26 RX ADMIN — DEXTROSE MONOHYDRATE, SODIUM CHLORIDE, AND POTASSIUM CHLORIDE 15 MILLILITER(S): 50; .745; 4.5 INJECTION, SOLUTION INTRAVENOUS at 19:23

## 2024-01-26 RX ADMIN — SODIUM CHLORIDE 2000 MILLILITER(S): 9 INJECTION INTRAMUSCULAR; INTRAVENOUS; SUBCUTANEOUS at 12:29

## 2024-01-26 RX ADMIN — PIPERACILLIN AND TAZOBACTAM 100 MILLIGRAM(S): 4; .5 INJECTION, POWDER, LYOPHILIZED, FOR SOLUTION INTRAVENOUS at 19:22

## 2024-01-26 RX ADMIN — Medication 400 MILLIGRAM(S): at 19:55

## 2024-01-26 RX ADMIN — DEXTROSE MONOHYDRATE, SODIUM CHLORIDE, AND POTASSIUM CHLORIDE 15 MILLILITER(S): 50; .745; 4.5 INJECTION, SOLUTION INTRAVENOUS at 19:16

## 2024-01-26 RX ADMIN — Medication 300 MILLIGRAM(S): at 14:15

## 2024-01-26 NOTE — H&P PEDIATRIC - NSHPPHYSICALEXAM_GEN_ALL_CORE
General- NAD. Non toxic. Non diaphoretic.   HEENT- Anicteric sclera  Lungs- Comfortable on room air  Abdomen- Soft, non tender. Well healed port sites

## 2024-01-26 NOTE — ED PROVIDER NOTE - CLINICAL SUMMARY MEDICAL DECISION MAKING FREE TEXT BOX
17 yo M with PMHx of Crohns s/p colectomy now POD 6 presenting w 1 day of fever, nausea. Patient has diffuse abdominal soreness appropriate for post operative period, no focal tenderness on exam. Has had normal bowel function. Denies cough, SOB, dysuria. Triggered code sepsis upon arrival for tachycardia, low blood pressures. Will draw CBC, cultures, inflammatory markers and start IV abx. Will obtain AXR and discuss need for cross sectional imaging with surgery team. Francisco Alvarez PGY2 17 yo M with PMHx of Crohns s/p ileocecectomy now POD 6 presenting w 1 day of fever, nausea. Patient has diffuse abdominal soreness appropriate for post operative period, no focal tenderness on exam. Has had normal bowel function. Denies cough, SOB, dysuria. Triggered code sepsis upon arrival for tachycardia, low blood pressures. Will draw CBC, cultures, inflammatory markers and start IV abx. Will obtain AXR and discuss need for cross sectional imaging with surgery team. Francisco Alvarez PGY2    Dennis Verduzco DO (PEM Attending): Patient with above past history recent surgery known to surgical service coming in with fever.  Patient recently discharged after postop fever on steroid course last had a dose yesterday.  Suspect fever today otherwise says has been well tolerating oral intake no increasing pain no other significant symptoms.  Patient arrives nontoxic-appearing he is awake alert ambulating normally very pleasant.  He has nonfocal examination normal neurostatus no chest pain no tachypnea clear lungs, his abdomen is soft and with postsurgical changes and minimal tenderness around incision site.  Normal extremities no calf swelling.  Due to patient's fever with tachycardia and initial soft blood pressures and met sepsis criteria and where and was treated with fluid bolus, culture,s work-up and empiric antibiotics.  Surgery team was aware and came down to evaluate patient requesting CT scan.

## 2024-01-26 NOTE — ED PEDIATRIC NURSE NOTE - CHIEF COMPLAINT QUOTE
Pt presents s/p surgery of colon resection on 1/19, discharged on 1/24. Low grade fever starting yesterday with temp rising to 103 this morning. Pt endorses nausea, denies vomiting or diarrhea, denies URI. Pt tolerating PO. PMH of RYAN powers, OVI. Tylenol @ 487

## 2024-01-26 NOTE — ED PEDIATRIC TRIAGE NOTE - AS TEMP SITE
What limits you the most:    Deconditioning? Start exercising a bit each day , 5 minutes 3 x/ day, then in 1 week, maybe 8 minutes 3x/ day  and can you walk 1/2 block and back ?? Fine to use a rollator or cane    B knee pain?  - check xrays  - [facility]o [facility]  [place] [phone]   - begin PT  - keep quads strong, proceed with weight loss  - walking 5 minutes 3x/ day     Breathing?   - try walking 5 minutes 3x/ day  - see how the breath goes and is it improving  - pool exercises might be helpful    A fibrillation  - seeing Dr Alcantara  - remains on eliquis  - see how much stamina you have    Hypothyroidism  - check TSH annually  - continue with levothyroxine daily on empty stomach     Elevated calcium level  - ddx includes vitamin D deficiency, primary hyperparathyroidism, artifact, etc  - check serum calcium, vitamin D, PTH level  - further recs to follow     oral

## 2024-01-26 NOTE — ED PEDIATRIC NURSE NOTE - OBJECTIVE STATEMENT
pt awake and alert easy WOB. ab soft and nondistended. ab tender to palpation, generalized. pt denies any discomfort or pain at this tome. pt having BM and flatus. 3 incision sites noted on abdomen from surgery no swelling redness or drainage from incision sites. pt POing and making good UO. pt endorses some nausea intermittently but not at this time. ED MD and surg MD at bedside for assessment.

## 2024-01-26 NOTE — ED PEDIATRIC NURSE REASSESSMENT NOTE - NS ED NURSE REASSESS COMMENT FT2
pt awake and alert. easy WOB. ab soft and nondistended. pt complaining of discomfort, IV Tylenol administered see flow sheet. pt drinking oral contrast in room otherwise NPO since 830am.  voided x2. ambulated steadily to restroom. pending CT. potassium infusing without difficulty. mom at bedside attentive to patient needs, safety maintained. comfort measures provided.
pt awake and alert. easy WOB. ab tender on palpation. pt complaining of abdominal discomfort ut denies pain. IV potassium running on pump. patient ambulating steadily to bathroom. pending CT results.
pt transported to [facility]. awake and alert. no discomfort or pain at this time.
Pt is sitting up on the strecher with parent at bedside. VS are stable, pt is afebrile. Pt is being admitted and will be transported upstairs once bed is available. 2x side rails up.

## 2024-01-26 NOTE — ED PEDIATRIC TRIAGE NOTE - CHIEF COMPLAINT QUOTE
Pt presents s/p surgery of colon resection on 1/19, discharged on 1/24. Low grade fever starting yesterday with temp rising to 103 this morning. Pt endorses nausea, denies vomiting or diarrhea, denies URI. Pt tolerating PO. PMH of RYAN powers, OVI. Tylenol @ 091

## 2024-01-26 NOTE — ED PROVIDER NOTE - PHYSICAL EXAMINATION
GENERAL: Awake, alert and interactive, no acute distress, mild discomfort due to abdominal soreness  HEAD: Normocephalic, atraumatic, PERRL  ENT: No conjunctivitis or scleral icterus, no rhinorrhea or congestion  MOUTH: mucous membranes moist  CARDIAC: Regular rate and rhythm, +S1/S2, no murmurs/rubs/gallops  PULM: Clear to auscultation bilaterally, no wheezes/rales/rhonchi  ABDOMEN: Soft, diffuse mild tenderness, nondistended, 3 surgical incision sites c/d/i w no erythema or drainage  MSK: Range of motion grossly intact, no edema, no tenderness  NEURO: No focal deficits, no acute change from baseline exam  SKIN: No rash or edema  VASC: Cap refill < 2 sec

## 2024-01-26 NOTE — H&P PEDIATRIC - HISTORY OF PRESENT ILLNESS
19 yo M with PMHx of Crohn's disease s/p colectomy on 1/19 presenting with 1 day of fever and nausea. Tmax at home was 103 this morning. He was discharged from [facility] on 1/24, postoperative course was notable for fever, no identified source. He received 2d of zosyn and was discharged home on steroid taper. At home, he reports having normal BM, soft text, no blood. He has been eating and drinking less due to nausea, no emesis. Denies cough, congestion, shortness of breath or dysuria. He endorses diffuse abdominal soreness but no focal pain or tenderness. Last remicade infusion was approximately 3-4 months ago. He had been taking rinvoq until 6 weeks ago.    Surgery is consulted due to recent admission for lap ileocecectomy for Crohn's. Patient eating well and stooling normally but febrile to 103 w/ nausea. Labs w/ elevated WBC. Was d/c with steroids but no longer on prednisone. Denies blood BM, fatigue, chest pain, chills.

## 2024-01-26 NOTE — ED PROVIDER NOTE - OBJECTIVE STATEMENT
Skip is a 19 yo M with PMHx of Crohn's disease s/p colectomy on 1/19 presenting with 1 day of fever and nausea. Tmax at home was 103 this morning. He was discharged from [facility] on 1/24, postoperative course was notable for fever, no identified source. He received 2d of zosyn and was discharged home on steroid taper. At home, he reports having normal BM, soft text, no blood. He has been eating and drinking less due to nausea, no emesis. Denies cough, congestion, shortness of breath or dysuria. He endorses diffuse abdominal soreness but no focal pain or tenderness. Last remicade infusion was approximately 3-4 months ago. He had been taking rinvoq until 6 weeks ago.  NKDA  IUTD

## 2024-01-26 NOTE — ED PEDIATRIC NURSE NOTE - CHILD ABUSE FACILITY
[facility] Brief Operative Note    Pre-operative diagnosis: Deviated nasal septum [J34.2]  Nasal obstruction [J34.89]  Nasal valve collapse [J34.89]   Post-operative diagnosis Same with inferior turbinate reduction   Procedure: Procedure(s):  Revision Septorhinoplasty, Repair of Nasal Valve Collapse, Cadaveric Rib Graft + 45 min Cosmetic Tip Rhinoplasty   Surgeon(s): Surgeon(s) and Role:     * Nikki Schwartz MD - Primary   Estimated blood loss: 65 ml    Specimens: * No specimens in log *   Findings: Alloderm and cadaveric rib used      MARYURI

## 2024-01-26 NOTE — ED PROVIDER NOTE - PROGRESS NOTE DETAILS
Galdino Chow MD PGY-6: Patient with labwork at this time unremarkable, pending diff. Elevated CRP for which reason he was covered with Zosyn. CXR unremarkable and Abdomen Xray showing scattered air-fluid levels. Will obtain CT to evaluate for surgical complications including anastomosis leak, obstruction or fistula, among others. Parents aware of tx plan. Patient with fever in the ED, for which will give IV tylenol and remain NPO. K of 3, will give K bolus and repeat K in 1 hr following interventions. received sign out from Dr. Verduzco. 19 yo male with hx of Crohn's, s/p resection last wednesday, here with fever and nausea x 1 days. completed steroid taper for post op fever recently. labs done, s/p zosyn, CRP elevated. xray scattered air fluid levels. CT PO/IV contrast ordered, will f/u results and surgery recommendations. Eros Newton MD Attending

## 2024-01-26 NOTE — CONSULT NOTE PEDS - ASSESSMENT
18 M s/p lap ileocecectomy on recent admission p/w fever.    -No acute surgical intervention  -CT A/P w/ IV and PO  -Labs  -Surgery will follow    D/w Dr. Bernadette Carrasquillo MD  PGY-3  Pediatric Surgery

## 2024-01-26 NOTE — CONSULT NOTE PEDS - SUBJECTIVE AND OBJECTIVE BOX
HPI: 19 yo M with PMHx of Crohn's disease s/p colectomy on 1/19 presenting with 1 day of fever and nausea. Tmax at home was 103 this morning. He was discharged from [facility] on 1/24, postoperative course was notable for fever, no identified source. He received 2d of zosyn and was discharged home on steroid taper. At home, he reports having normal BM, soft text, no blood. He has been eating and drinking less due to nausea, no emesis. Denies cough, congestion, shortness of breath or dysuria. He endorses diffuse abdominal soreness but no focal pain or tenderness. Last remicade infusion was approximately 3-4 months ago. He had been taking rinvoq until 6 weeks ago.    Surgery is consulted due to recent admission for lap ileocecectomy for Crohn's. Patient eating well and stooling normally but febrile to 103 w/ nausea. Labs w/ elevated WBC. Was d/c with steroids but no longer on prednisone. Denies blood BM, fatigue, chest pain, chills.    PMH: As above    PSH: As above    Allergies: NKDA    Physical exam:    /62 HR 96 SPO2 99 T 100.5 RR 18    General- NAD. Non toxic. Non diaphoretic.   HEENT- Anicteric sclera  Lungs- Comfortable on room air  Abdomen- Soft, non tender. Well healed port sites    Imaging:       ACC: [id number] EXAM:  XR CHEST PA LAT 2V   ORDERED BY: BRAD DEVRIES     ACC: [id number] EXAM:  XR ABDOMEN 2V   ORDERED BY: YOLANDA PEARSON     PROCEDURE DATE:  01/26/2024          INTERPRETATION:  EXAMINATION: XR ABDOMEN 2 VIEWS, XR CHEST PA AND LATERAL    CLINICAL INFORMATION: Postop fever    TECHNIQUE: Chest AP and lateral, abdomen supine and decubitus dated   1/26/2024 12:56 PM    COMPARISON: 1/21/2024    FINDINGS:    CHEST: The cardiothymic silhouette is normal in size. There is no focal   consolidation, pleural effusion or pneumothorax. The pulmonary   vascularity is within normal limits.    ABDOMEN: There is a paucity of bowel gas. There are scattered air-fluid   levels on the decubitus view. There is no intraperitoneal free air. No   abnormal calcification.    IMPRESSION: Scattered air-fluid levels. Clear lungs.    --- End of Report ---            VISH DOWELL MD; Attending Radiologist  This document has been electronically signed. Jan 26 2024  1:05PM

## 2024-01-26 NOTE — CONSULT NOTE PEDS - ATTENDING COMMENTS
Pt seen and examined    19 yo M with history of Crohn's disease who is s/p Pt seen and examined    19 yo M with history of Crohn's disease who is s/p lap-assisted Pt seen and examined    19 yo M with history of Crohn's disease who is s/p lap-assisted ileocecectomy on 1/19/24. He was discharged home on 1/24 with a steroid taper. He is tolerating a regular diet and having 2 formed bowel movements per day. He last ate a muffin at home this am. He reports being febrile to 103 F today. He also has nausea.     On exam, NAD  Abdomen soft, mildly distended, incisions healing well, no peritonitis    WBC 14.2 (from 13.3 on 1/22)  CXR without abnormalities    Undergoing fever work-up  UA pending  Blood cultures obtained  CT AP with oral and IV contrast  Discussed with Typer and mother, all questions answered

## 2024-01-26 NOTE — H&P PEDIATRIC - ASSESSMENT
18 M p/w fluid collection s/p lap ileocecectomy.    -Admit to Dr. Fleming  -C/w IV Zosyn  -NPO, mIVF  -20 BID Prednisone  -IR consult not necessary    D/w Dr. Bernadette Carrasquillo MD  PGY-3  Pediatric Surgery 18 M p/w fluid collection s/p lap ileocecectomy.    -Admit to Dr. Fleming  -C/w IV Zosyn  -CLD, mIVF  -Hold steroids  -IR consult not necessary    D/w Dr. Bernadette Carrasquillo MD  PGY-3  Pediatric Surgery 18 M p/w fluid collection s/p lap ileocecectomy.    -Admit to Dr. Fleming  -C/w IV Zosyn  -CLD, mIVF  -f/u cx  -Hold steroids  -IR consult not necessary    D/w Dr. Bernadette Carrasquillo MD  PGY-3  Pediatric Surgery

## 2024-01-27 ENCOUNTER — TRANSCRIPTION ENCOUNTER (OUTPATIENT)
Age: 19
End: 2024-01-27

## 2024-01-27 VITALS
RESPIRATION RATE: 18 BRPM | OXYGEN SATURATION: 98 % | SYSTOLIC BLOOD PRESSURE: 115 MMHG | DIASTOLIC BLOOD PRESSURE: 66 MMHG | TEMPERATURE: 100 F | HEART RATE: 93 BPM

## 2024-01-27 LAB
CULTURE RESULTS: NO GROWTH — SIGNIFICANT CHANGE UP
SPECIMEN SOURCE: SIGNIFICANT CHANGE UP

## 2024-01-27 RX ORDER — ACETAMINOPHEN 500 MG
650 TABLET ORAL EVERY 6 HOURS
Refills: 0 | Status: DISCONTINUED | OUTPATIENT
Start: 2024-01-27 | End: 2024-01-27

## 2024-01-27 RX ORDER — CIPROFLOXACIN LACTATE 400MG/40ML
1 VIAL (ML) INTRAVENOUS
Qty: 14 | Refills: 0
Start: 2024-01-27 | End: 2024-02-02

## 2024-01-27 RX ORDER — CIPROFLOXACIN LACTATE 400MG/40ML
1 VIAL (ML) INTRAVENOUS
Qty: 28 | Refills: 0
Start: 2024-01-27 | End: 2024-02-09

## 2024-01-27 RX ORDER — POTASSIUM CHLORIDE 20 MEQ
20 PACKET (EA) ORAL ONCE
Refills: 0 | Status: COMPLETED | OUTPATIENT
Start: 2024-01-27 | End: 2024-01-27

## 2024-01-27 RX ORDER — METRONIDAZOLE 500 MG
1 TABLET ORAL
Qty: 42 | Refills: 0
Start: 2024-01-27 | End: 2024-02-09

## 2024-01-27 RX ORDER — POTASSIUM CHLORIDE 20 MEQ
10 PACKET (EA) ORAL ONCE
Refills: 0 | Status: DISCONTINUED | OUTPATIENT
Start: 2024-01-27 | End: 2024-01-27

## 2024-01-27 RX ORDER — METRONIDAZOLE 500 MG
1 TABLET ORAL
Qty: 21 | Refills: 0
Start: 2024-01-27 | End: 2024-02-02

## 2024-01-27 RX ADMIN — PIPERACILLIN AND TAZOBACTAM 100 MILLIGRAM(S): 4; .5 INJECTION, POWDER, LYOPHILIZED, FOR SOLUTION INTRAVENOUS at 01:16

## 2024-01-27 RX ADMIN — DEXTROSE MONOHYDRATE, SODIUM CHLORIDE, AND POTASSIUM CHLORIDE 110 MILLILITER(S): 50; .745; 4.5 INJECTION, SOLUTION INTRAVENOUS at 07:22

## 2024-01-27 RX ADMIN — DEXTROSE MONOHYDRATE, SODIUM CHLORIDE, AND POTASSIUM CHLORIDE 110 MILLILITER(S): 50; .745; 4.5 INJECTION, SOLUTION INTRAVENOUS at 03:18

## 2024-01-27 RX ADMIN — Medication 20 MILLIEQUIVALENT(S): at 02:11

## 2024-01-27 RX ADMIN — PIPERACILLIN AND TAZOBACTAM 100 MILLIGRAM(S): 4; .5 INJECTION, POWDER, LYOPHILIZED, FOR SOLUTION INTRAVENOUS at 13:01

## 2024-01-27 RX ADMIN — PIPERACILLIN AND TAZOBACTAM 100 MILLIGRAM(S): 4; .5 INJECTION, POWDER, LYOPHILIZED, FOR SOLUTION INTRAVENOUS at 06:53

## 2024-01-27 NOTE — DISCHARGE NOTE PROVIDER - CARE PROVIDER_API CALL
Jake Liz  Pediatric Surgery  [address] [place] [address], [address]  [place] [address]  Phone: [phone]  Fax: [phone]  Follow Up Time:

## 2024-01-27 NOTE — DISCHARGE NOTE PROVIDER - HOSPITAL COURSE
SKIP GONZALEZ is a 18y Male who was admitted to [facility] for post-op fever workup. Skip has a history of Crohn's disease who is s/p lap-assisted ileocecectomy on 1/19/24. He was discharged home on 1/24 with a steroid taper. He is tolerating a regular diet and having 2 formed bowel movements per day. He presented to the ED with fever to 103 F on 1/26. Fever workup was unremarkable. CT A/P with oral and IV contrast demonstrates a new 10.5 x 5 x 8.0 cm partially walled off collection in the pelvis. Clinically, pt doesn't appear to be ill.     At time of discharge, pt was tolerating a regular diet, voiding/stooling independently, ambulating, and pain was well-controlled. Patient and family felt ready for discharge. SKIP GONZALEZ is a 18y Male who [place] [facility] [place] work[place]. Skip has a history of Crohn's disease who is s/p lap-assisted ileocecectomy on 1/19/24. He was discharged home on 1/24 with a steroid taper. He is tolerating a regular diet and having 2 formed bowel movements per day. He presented to the ED with fever to 103 F on 1/26. Fever workup was unremarkable. CT A/P with oral and IV contrast demonstrates a new 10.5 x 5 x 8.0 cm partially walled off collection in the pelvis. Abdomen was soft, non-tender, nondistended.     At time of discharge, pt was tolerating a regular diet, voiding/stooling independently, ambulating, and pain was well-controlled. Patient and family felt ready for discharge. SKIP GONZALEZ is a 18y Male who was admitted to [facility] for post-op fever workup. Skip has a history of Crohn's disease who is s/p lap-assisted ileocecectomy on 1/19/24. He was discharged home on 1/24 with a steroid taper. He is tolerating a regular diet and having 2 formed bowel movements per day. He presented to the ED with fever to 103 F on 1/26. Fever workup was unremarkable. CT A/P with oral and IV contrast demonstrates a new 10.5 x 5 x 8.0 cm partially walled off collection in the pelvis. Abdomen was soft, non-tender, nondistended. After receiving 1 day of IV zosyn, pt's symptoms improved clinically. Pt was afebrile and vital signs were stable     At time of discharge, pt was tolerating a regular diet, voiding/stooling independently, ambulating, and pain was well-controlled. Patient and family felt ready for discharge.

## 2024-01-27 NOTE — DISCHARGE NOTE PROVIDER - NSDCMRMEDTOKEN_GEN_ALL_CORE_FT
acetaminophen 160 mg/5 mL oral suspension: 20 milliliter(s) orally every 6 hours as needed for  pain  ibuprofen 100 mg/5 mL oral suspension: 20 milliliter(s) orally every 6 hours  pantoprazole 20 mg oral delayed release tablet: 1 tab(s) orally once a day  predniSONE 10 mg oral tablet: 1 tab(s) orally take one 10 mg tablet in AM on 1/26 to complete taper  predniSONE 20 mg oral tablet: 1 tab(s) orally once a day prednisone taper: take 1 20 mg tablet at night on 1/24, one 20 mg tablet 1/25 morning.   acetaminophen 160 mg/5 mL oral suspension: 20 milliliter(s) orally every 6 hours as needed for  pain  Cipro 500 mg oral tablet: 1 tab(s) orally 2 times a day  ibuprofen 100 mg/5 mL oral suspension: 20 milliliter(s) orally every 6 hours  metroNIDAZOLE 500 mg oral tablet: 1 tab(s) orally 3 times a day  predniSONE 10 mg oral tablet: 1 tab(s) orally take one 10 mg tablet in AM on 1/26 to complete taper  predniSONE 20 mg oral tablet: 1 tab(s) orally once a day prednisone taper: take 1 20 mg tablet at night on 1/24, one 20 mg tablet 1/25 morning.

## 2024-01-27 NOTE — PROGRESS NOTE PEDS - SUBJECTIVE AND OBJECTIVE BOX
Pediatric Surgery Daily Progress Note  =====================================================    SUBJECTIVE: Patient reports that they're feeling well. Denies fever, chills, N/V, chest pain, SOB.    --------------------------------------------------------------------------------------  VITAL SIGNS:  T(C): 36.7 (01-27-24 @ 01:23), Max: 38.3 (01-26-24 @ 19:08)  HR: 88 (01-27-24 @ 01:23) (85 - 122)  BP: 121/63 (01-27-24 @ 01:23) (96/55 - 126/55)  RR: 18 (01-27-24 @ 01:23) (16 - 18)  SpO2: 98% (01-27-24 @ 01:23) (98% - 100%)  --------------------------------------------------------------------------------------    EXAM    General: NAD, resting in bed comfortably  Cardiac: Regular rate, warm and well perfused  Respiratory: Nonlabored respirations, normal cw expansion, on RA  Abdomen: Soft, nondistended, nontender to palpation  Extremities: Warm, well perfused      --------------------------------------------------------------------------------------      Imaging:  CT A&P:  IMPRESSION:  1. A new 10.5 x 5 x 8.0 cm partially walled off collection in the pelvis.   Inflammatory changes with associated bowel wall thickening in the right   lower quadrant. No extravasation of oral contrast. Small ascites. No free   air.  2. Small left pleural effusion and mild atelectasis in the lingula.

## 2024-01-27 NOTE — DISCHARGE NOTE NURSING/CASE MANAGEMENT/SOCIAL WORK - PATIENT PORTAL LINK FT
You can access the FollowMyHealth Patient Portal offered by [facility] by registering at the following website: [email or web address]. By joining [email or web address]’s FollowMyHealth portal, you will also be able to view your health information using other applications (apps) compatible with our system.

## 2024-01-27 NOTE — PROGRESS NOTE PEDS - ASSESSMENT
18 M p/w fluid collection s/p lap ileocecectomy. CT AP demonstrates new collection in the RLQ. Patient afebrile overnight.     -C/w IV Zosyn  -Regular diet, mIVF  -f/u cx  -Hold steroids    Pediatric Surgery  [phone]

## 2024-01-31 LAB
CULTURE RESULTS: SIGNIFICANT CHANGE UP
SPECIMEN SOURCE: SIGNIFICANT CHANGE UP

## 2024-02-14 LAB — SURGICAL PATHOLOGY STUDY: SIGNIFICANT CHANGE UP

## 2024-02-15 ENCOUNTER — APPOINTMENT (OUTPATIENT)
Dept: PEDIATRIC SURGERY | Facility: CLINIC | Age: 19
End: 2024-02-15
Payer: COMMERCIAL

## 2024-02-15 VITALS — HEIGHT: 70 IN | WEIGHT: 142.44 LBS | BODY MASS INDEX: 20.39 KG/M2 | TEMPERATURE: 97.9 F

## 2024-02-15 DIAGNOSIS — K50.90 CROHN'S DISEASE, UNSPECIFIED, W/OUT COMPLICATIONS: ICD-10-CM

## 2024-02-15 PROCEDURE — 99024 POSTOP FOLLOW-UP VISIT: CPT

## 2024-02-15 NOTE — CONSULT LETTER
[Dear  ___] : Dear  [unfilled], [Courtesy Letter:] : I had the pleasure of seeing your patient, [unfilled], in my office today. [Please see my note below.] : Please see my note below. [Consult Closing:] : Thank you very much for allowing me to participate in the care of this patient.  If you have any questions, please do not hesitate to contact me. [Sincerely,] : Sincerely, [FreeTextEntry2] : Dr Cordoba [FreeTextEntry3] : Roseanne Jean  MSN  CPNP Pediatric Nurse Practitioner Department of Pediatric Surgery [facility] phone [phone] fax [phone]

## 2024-02-15 NOTE — REASON FOR VISIT
[____ Month(s)] : [unfilled] month(s)  [Other: ____] : [unfilled] [Patient] : patient [Mother] : mother [Medical Records] : medical records [Pain] : ~He/She~ does not have pain [Fever] : ~He/She~ does not have fever [Normal bowel movements] : ~He/She~ has normal bowel movements [Vomiting] : ~He/She~ does not have vomiting [Tolerating Diet] : ~He/She~ is tolerating diet [Redness at incision] : ~He/She~ does not have redness at incision [Drainage at incision] : ~He/She~ does not have drainage at incision [Swelling at surgical site] : ~He/She~ does not have swelling at surgical site [de-identified] : 1-19-24 [de-identified] : Dr Liz [de-identified] : Skip is 1 month post from laparoscopic laparoscopic assisted ileocecectomy performed via umbilical incision.  He was d/c home on POD #5 on steroid taper dose.  Was readmitted at 1week post op with fever to 103.  CT A/P with oral and IV contrast demonstrates a new 10.5 x 5 x 8.0 cm partially walled off collection in the pelvis. HE was started on IV antibiotics and d/c home the following day on oral cipro and flagyl which he finished 3 days ago.  Pathology is consistent with crohns with viable margins of resection without inflammation. Stooling soft stool 3-4x per day denies abd cramping or bloody stool.  Will make an apt to f/u w Dr Quezada

## 2024-02-15 NOTE — ASSESSMENT
[FreeTextEntry1] :  GIA GONZALEZ  is a 18 year boy  doing well and has recovered nicely from his ileocecectomy for Crohns.  . Pathology review with his family and consistent with Crohn's disease with healthy margins Post operative expectations reviewed. The patient is cleared to resume full physical activity as tolerated. He  can return to see us as needed. The caretaker may contact us with any further questions.   Dr Liz was into examine him and discuss post op expectations recommended high protein diet avoid nuts, seeds and raw vegetables at this time.  Focus on healthy calories and gaining weight.  Counselled him about risk of SBO from abd surgery and how to proceed.  Counselled him that the resection will not cure the Crohn's but will improve his symptoms, so he will require f/u with Dr Quezada for a plan going forward.  Fatigue should improve daily.  His appendix has been removed with the surgery so he cannot get an appendicitis.  All questions answered.  HE can f/u PRN

## 2024-02-15 NOTE — PHYSICAL EXAM
[Clean] : clean [Dry] : dry [Erythema] : no erythema [Granulation tissue] : no granulation tissue [Drainage] : no drainage [NL] : soft, not tender, not distended

## 2024-03-08 ENCOUNTER — NON-APPOINTMENT (OUTPATIENT)
Age: 19
End: 2024-03-08

## 2024-03-08 LAB
ALBUMIN SERPL ELPH-MCNC: 4 G/DL
ALP BLD-CCNC: 84 U/L
ALT SERPL-CCNC: 15 U/L
ANION GAP SERPL CALC-SCNC: 13 MMOL/L
AST SERPL-CCNC: 13 U/L
BASOPHILS # BLD AUTO: 0.04 K/UL
BASOPHILS NFR BLD AUTO: 0.3 %
BILIRUB SERPL-MCNC: 0.3 MG/DL
BUN SERPL-MCNC: 10 MG/DL
CALCIUM SERPL-MCNC: 9.1 MG/DL
CHLORIDE SERPL-SCNC: 101 MMOL/L
CO2 SERPL-SCNC: 24 MMOL/L
CREAT SERPL-MCNC: 0.88 MG/DL
CREAT SERPL-MCNC: 0.9 MG/DL
CRP SERPL-MCNC: 35 MG/L
EGFR: 126 ML/MIN/1.73M2
EGFR: 127 ML/MIN/1.73M2
EOSINOPHIL # BLD AUTO: 0.18 K/UL
EOSINOPHIL NFR BLD AUTO: 1.2 %
ERYTHROCYTE [SEDIMENTATION RATE] IN BLOOD BY WESTERGREN METHOD: 51 MM/HR
GLUCOSE SERPL-MCNC: 97 MG/DL
HCT VFR BLD CALC: 36.2 %
HGB BLD-MCNC: 11.8 G/DL
IMM GRANULOCYTES NFR BLD AUTO: 0.5 %
LYMPHOCYTES # BLD AUTO: 1.8 K/UL
LYMPHOCYTES NFR BLD AUTO: 11.6 %
MAN DIFF?: NORMAL
MCHC RBC-ENTMCNC: 26.8 PG
MCHC RBC-ENTMCNC: 32.6 GM/DL
MCV RBC AUTO: 82.1 FL
MONOCYTES # BLD AUTO: 1.43 K/UL
MONOCYTES NFR BLD AUTO: 9.2 %
NEUTROPHILS # BLD AUTO: 11.97 K/UL
NEUTROPHILS NFR BLD AUTO: 77.2 %
PLATELET # BLD AUTO: 366 K/UL
POTASSIUM SERPL-SCNC: 4.2 MMOL/L
PROT SERPL-MCNC: 7.6 G/DL
RBC # BLD: 4.41 M/UL
RBC # FLD: 13.2 %
SODIUM SERPL-SCNC: 138 MMOL/L
WBC # FLD AUTO: 15.49 K/UL

## 2024-03-12 ENCOUNTER — OUTPATIENT (OUTPATIENT)
Dept: OUTPATIENT SERVICES | Facility: HOSPITAL | Age: 19
LOS: 1 days | End: 2024-03-12
Payer: COMMERCIAL

## 2024-03-12 ENCOUNTER — RESULT REVIEW (OUTPATIENT)
Age: 19
End: 2024-03-12

## 2024-03-12 ENCOUNTER — APPOINTMENT (OUTPATIENT)
Dept: CT IMAGING | Facility: CLINIC | Age: 19
End: 2024-03-12
Payer: COMMERCIAL

## 2024-03-12 DIAGNOSIS — K50.90 CROHN'S DISEASE, UNSPECIFIED, WITHOUT COMPLICATIONS: ICD-10-CM

## 2024-03-12 DIAGNOSIS — Z98.890 OTHER SPECIFIED POSTPROCEDURAL STATES: Chronic | ICD-10-CM

## 2024-03-12 PROCEDURE — 74178 CT ABD&PLV WO CNTR FLWD CNTR: CPT

## 2024-03-12 PROCEDURE — 74178 CT ABD&PLV WO CNTR FLWD CNTR: CPT | Mod: 26

## 2024-09-24 ENCOUNTER — NON-APPOINTMENT (OUTPATIENT)
Age: 19
End: 2024-09-24

## 2024-10-02 ENCOUNTER — NON-APPOINTMENT (OUTPATIENT)
Age: 19
End: 2024-10-02

## 2025-04-02 ENCOUNTER — NON-APPOINTMENT (OUTPATIENT)
Age: 20
End: 2025-04-02

## (undated) DEVICE — SOL IRR POUR NS 0.9% 1500ML

## (undated) DEVICE — POSITIONER STRAP ARMBOARD VELCRO TS-30

## (undated) DEVICE — GLV 8 PROTEXIS (WHITE)

## (undated) DEVICE — SOL IRR POUR H2O 1500ML

## (undated) DEVICE — SHEARS HARMONIC ACE 5MM X 36CM CURVED TIP

## (undated) DEVICE — CATH INSERTION TRAY W 10CC SYRINGE

## (undated) DEVICE — PAD NAIL POLISH REMOVER

## (undated) DEVICE — DRSG STERISTRIPS 0.5 X 4"

## (undated) DEVICE — SUT VICRYL 2-0 27" UR-6

## (undated) DEVICE — URETERAL CATH RED RUBBER 14FR (GREEN)

## (undated) DEVICE — LUBRICATING JELLY ONESHOT 1.25OZ

## (undated) DEVICE — DRAPE MINOR PROCEDURE

## (undated) DEVICE — LIGASURE BLUNT TIP 37CM

## (undated) DEVICE — URETERAL CATH RED RUBBER 10FR (BLACK)

## (undated) DEVICE — SUT MONOCRYL 5-0 18" P-1 UNDYED

## (undated) DEVICE — PACK PERI GYN

## (undated) DEVICE — PREP BETADINE KIT

## (undated) DEVICE — STAPLER COVIDIEN ENDO GIA SHORT HANDLE

## (undated) DEVICE — SUT VICRYL 2-0 18" CT-1 UNDYED (POP-OFF)

## (undated) DEVICE — SUT VICRYL 2-0 18" TIES UNDYED

## (undated) DEVICE — ELCTR GROUNDING PAD PEDS COVIDIEN

## (undated) DEVICE — URETERAL CATH RED RUBBER 18FR (RED)

## (undated) DEVICE — SUT VICRYL 0 27" UR-6

## (undated) DEVICE — POOLE SUCTION TIP

## (undated) DEVICE — Device

## (undated) DEVICE — TUBING STRYKER PNEUMOCLEAR SMOKE EVACUATION HIGH FLOW

## (undated) DEVICE — DRSG DERMABOND 0.7ML

## (undated) DEVICE — SUT PLAIN GUT FAST ABSORBING 5-0 PC-1

## (undated) DEVICE — FOLEY CATH 2-WAY 12FR 5CC LATEX FREE

## (undated) DEVICE — ELCTR BOVIE TIP BLADE INSULATED 2.75" EDGE

## (undated) DEVICE — PACK GENERAL LAPAROSCOPY

## (undated) DEVICE — URETERAL CATH RED RUBBER 12FR (WHITE)

## (undated) DEVICE — SUT VICRYL 0 18" TIES UNDYED

## (undated) DEVICE — BASIN SET SINGLE

## (undated) DEVICE — BAG URINE W METER 2L